# Patient Record
Sex: FEMALE | Race: BLACK OR AFRICAN AMERICAN | Employment: UNEMPLOYED | URBAN - METROPOLITAN AREA
[De-identification: names, ages, dates, MRNs, and addresses within clinical notes are randomized per-mention and may not be internally consistent; named-entity substitution may affect disease eponyms.]

---

## 2017-12-07 ENCOUNTER — HOSPITAL ENCOUNTER (EMERGENCY)
Age: 22
Discharge: HOME | End: 2017-12-07
Attending: EMERGENCY MEDICINE
Payer: SELF-PAY

## 2017-12-07 VITALS
RESPIRATION RATE: 17 BRPM | BODY MASS INDEX: 44.3 KG/M2 | OXYGEN SATURATION: 98 % | HEART RATE: 65 BPM | SYSTOLIC BLOOD PRESSURE: 131 MMHG | DIASTOLIC BLOOD PRESSURE: 62 MMHG | WEIGHT: 250 LBS | HEIGHT: 63 IN | TEMPERATURE: 98 F

## 2017-12-07 DIAGNOSIS — K52.9 GASTROENTERITIS: Primary | ICD-10-CM

## 2017-12-07 LAB
ALBUMIN SERPL-MCNC: 3.4 G/DL (ref 3.5–5)
ALBUMIN/GLOB SERPL: 0.8 {RATIO} (ref 1.2–3.5)
ALP SERPL-CCNC: 85 U/L (ref 50–136)
ALT SERPL-CCNC: 23 U/L (ref 12–65)
ANION GAP SERPL CALC-SCNC: 7 MMOL/L (ref 7–16)
AST SERPL-CCNC: 23 U/L (ref 15–37)
BASOPHILS # BLD: 0 K/UL (ref 0–0.2)
BASOPHILS NFR BLD: 0 % (ref 0–2)
BILIRUB SERPL-MCNC: 0.6 MG/DL (ref 0.2–1.1)
BUN SERPL-MCNC: 10 MG/DL (ref 6–23)
CALCIUM SERPL-MCNC: 8.7 MG/DL (ref 8.3–10.4)
CHLORIDE SERPL-SCNC: 106 MMOL/L (ref 98–107)
CO2 SERPL-SCNC: 27 MMOL/L (ref 21–32)
CREAT SERPL-MCNC: 0.89 MG/DL (ref 0.6–1)
DIFFERENTIAL METHOD BLD: ABNORMAL
EOSINOPHIL # BLD: 0 K/UL (ref 0–0.8)
EOSINOPHIL NFR BLD: 0 % (ref 0.5–7.8)
ERYTHROCYTE [DISTWIDTH] IN BLOOD BY AUTOMATED COUNT: 14.7 % (ref 11.9–14.6)
GLOBULIN SER CALC-MCNC: 4.3 G/DL (ref 2.3–3.5)
GLUCOSE SERPL-MCNC: 109 MG/DL (ref 65–100)
HCG UR QL: NEGATIVE
HCT VFR BLD AUTO: 43.5 % (ref 35.8–46.3)
HGB BLD-MCNC: 14 G/DL (ref 11.7–15.4)
IMM GRANULOCYTES # BLD: 0 K/UL (ref 0–0.5)
IMM GRANULOCYTES NFR BLD AUTO: 0 % (ref 0–5)
LIPASE SERPL-CCNC: 123 U/L (ref 73–393)
LYMPHOCYTES # BLD: 1 K/UL (ref 0.5–4.6)
LYMPHOCYTES NFR BLD: 13 % (ref 13–44)
MCH RBC QN AUTO: 25.9 PG (ref 26.1–32.9)
MCHC RBC AUTO-ENTMCNC: 32.2 G/DL (ref 31.4–35)
MCV RBC AUTO: 80.4 FL (ref 79.6–97.8)
MONOCYTES # BLD: 0.8 K/UL (ref 0.1–1.3)
MONOCYTES NFR BLD: 11 % (ref 4–12)
NEUTS SEG # BLD: 5.5 K/UL (ref 1.7–8.2)
NEUTS SEG NFR BLD: 76 % (ref 43–78)
PLATELET # BLD AUTO: 407 K/UL (ref 150–450)
PMV BLD AUTO: 9.5 FL (ref 10.8–14.1)
POTASSIUM SERPL-SCNC: 3.8 MMOL/L (ref 3.5–5.1)
PROT SERPL-MCNC: 7.7 G/DL (ref 6.3–8.2)
RBC # BLD AUTO: 5.41 M/UL (ref 4.05–5.25)
SODIUM SERPL-SCNC: 140 MMOL/L (ref 136–145)
WBC # BLD AUTO: 7.3 K/UL (ref 4.3–11.1)

## 2017-12-07 PROCEDURE — 81025 URINE PREGNANCY TEST: CPT

## 2017-12-07 PROCEDURE — 83690 ASSAY OF LIPASE: CPT | Performed by: EMERGENCY MEDICINE

## 2017-12-07 PROCEDURE — 80053 COMPREHEN METABOLIC PANEL: CPT | Performed by: EMERGENCY MEDICINE

## 2017-12-07 PROCEDURE — 81003 URINALYSIS AUTO W/O SCOPE: CPT | Performed by: EMERGENCY MEDICINE

## 2017-12-07 PROCEDURE — 74011250636 HC RX REV CODE- 250/636: Performed by: EMERGENCY MEDICINE

## 2017-12-07 PROCEDURE — 96361 HYDRATE IV INFUSION ADD-ON: CPT | Performed by: EMERGENCY MEDICINE

## 2017-12-07 PROCEDURE — 96374 THER/PROPH/DIAG INJ IV PUSH: CPT | Performed by: EMERGENCY MEDICINE

## 2017-12-07 PROCEDURE — 85025 COMPLETE CBC W/AUTO DIFF WBC: CPT | Performed by: EMERGENCY MEDICINE

## 2017-12-07 PROCEDURE — 99284 EMERGENCY DEPT VISIT MOD MDM: CPT | Performed by: EMERGENCY MEDICINE

## 2017-12-07 RX ORDER — ONDANSETRON 4 MG/1
4 TABLET, ORALLY DISINTEGRATING ORAL
Qty: 11 TAB | Refills: 1 | Status: SHIPPED | OUTPATIENT
Start: 2017-12-07 | End: 2018-11-25

## 2017-12-07 RX ORDER — ONDANSETRON 2 MG/ML
4 INJECTION INTRAMUSCULAR; INTRAVENOUS
Status: COMPLETED | OUTPATIENT
Start: 2017-12-07 | End: 2017-12-07

## 2017-12-07 RX ADMIN — ONDANSETRON 4 MG: 2 INJECTION INTRAMUSCULAR; INTRAVENOUS at 09:21

## 2017-12-07 RX ADMIN — SODIUM CHLORIDE 1000 ML: 900 INJECTION, SOLUTION INTRAVENOUS at 09:21

## 2017-12-07 NOTE — ED NOTES
I have reviewed discharge instructions with the patient. The patient verbalized understanding. Patient left ED via Discharge Method: ambulatory to Home with self. Opportunity for questions and clarification provided. Patient given 1 scripts. Esign not available. To continue your aftercare when you leave the hospital, you may receive an automated call from our care team to check in on how you are doing. This is a free service and part of our promise to provide the best care and service to meet your aftercare needs.  If you have questions, or wish to unsubscribe from this service please call [phone]. Thank you for Choosing our [facility] Emergency Department.

## 2017-12-07 NOTE — ED TRIAGE NOTES
Pt arrived ambulatory via POV with c/o vomiting since yesterday, can't keep anything down, Pt reports feeling hot and cold. Denies any sore throat, congestion or cough, states diarrhea that started today.

## 2017-12-07 NOTE — ED PROVIDER NOTES
HPI Comments: Patient is a 57-year-old female comes to the emergency department today complaining of chills, myalgias, and vomiting which began yesterday morning. She was unable to tolerate any oral intake yesterday. This morning she also had nonbloody diarrhea. She states that her  and her children had a recent stomach illness but are doing better. Patient is a 23 y.o. female presenting with vomiting. The history is provided by the patient and the spouse. Vomiting    This is a new problem. The current episode started yesterday. The problem occurs 5 to 10 times per day. The problem has not changed since onset. The emesis has an appearance of stomach contents. Patient reports a subjective fever - was not measured. The fever has been present for 1 - 2 days. Associated symptoms include chills, diarrhea and myalgias. Pertinent negatives include no abdominal pain, no headaches, no cough and no headaches. The patient is not pregnant. Risk factors include ill contacts. Past Medical History:   Diagnosis Date    Cellulitis     GERD (gastroesophageal reflux disease)     Migraine headache     Obesity     Otitis media     UTI (urinary tract infection)        Past Surgical History:   Procedure Laterality Date    HX GYN          HX HEENT      tonsils-adenoids         No family history on file. Social History     Social History    Marital status: SINGLE     Spouse name: N/A    Number of children: N/A    Years of education: N/A     Occupational History    Not on file. Social History Main Topics    Smoking status: Never Smoker    Smokeless tobacco: Never Used    Alcohol use No    Drug use: No    Sexual activity: Not on file     Other Topics Concern    Not on file     Social History Narrative         ALLERGIES: Review of patient's allergies indicates no known allergies. Review of Systems   Constitutional: Positive for chills. HENT: Negative. Eyes: Negative.     Respiratory: Negative for cough. Cardiovascular: Negative. Gastrointestinal: Positive for diarrhea, nausea and vomiting. Negative for abdominal pain. Endocrine: Negative. Genitourinary: Negative. Musculoskeletal: Positive for myalgias. Skin: Negative. Neurological: Negative for headaches. Vitals:    12/07/17 0901   BP: 118/82   Pulse: 90   Resp: 19   Temp: 98 °F (36.7 °C)   SpO2: 96%   Weight: 113.4 kg (250 lb)   Height: 5' 3\" (1.6 m)            Physical Exam   Constitutional: She is oriented to person, place, and time. She appears well-developed and well-nourished. HENT:   Head: Normocephalic and atraumatic. Eyes: EOM are normal. Pupils are equal, round, and reactive to light. Neck: Normal range of motion. Neck supple. Cardiovascular: Normal rate, regular rhythm and intact distal pulses. Pulmonary/Chest: Effort normal and breath sounds normal.   Abdominal: Soft. There is no tenderness. There is no rebound and no guarding. Neurological: She is alert and oriented to person, place, and time. Skin: Skin is warm and dry. MDM  Number of Diagnoses or Management Options  Diagnosis management comments: Differential diagnosis includes gastroenteritis, viral syndrome, food poisoning, pancreatitis, dehydration    We will hydrate with IV fluids and some Zofran. We'll check basic screening labs. Amount and/or Complexity of Data Reviewed  Clinical lab tests: ordered and reviewed    Risk of Complications, Morbidity, and/or Mortality  Presenting problems: moderate  Diagnostic procedures: low  Management options: low    Patient Progress  Patient progress: improved    ED Course   10:25 AM  Labs and urinalysis are negative. She feels better after fluids and Zofran. I feel this all likely represents a viral gastroenteritis. Voice dictation software was used during the making of this note. This software is not perfect and grammatical and other typographical errors may be present.   This note has been proofread, but may still contain errors.   Gisela Haynes MD; 12/7/2017 @10:25 AM   ===================================================================        Procedures

## 2017-12-07 NOTE — DISCHARGE INSTRUCTIONS
Gastroenteritis: Care Instructions  Your Care Instructions    Gastroenteritis is an illness that may cause nausea, vomiting, and diarrhea. It is sometimes called \"stomach flu. \" It can be caused by bacteria or a virus. You will probably begin to feel better in 1 to 2 days. In the meantime, get plenty of rest and make sure you do not become dehydrated. Dehydration occurs when your body loses too much fluid. Follow-up care is a key part of your treatment and safety. Be sure to make and go to all appointments, and call your doctor if you are having problems. It's also a good idea to know your test results and keep a list of the medicines you take. How can you care for yourself at home? · If your doctor prescribed antibiotics, take them as directed. Do not stop taking them just because you feel better. You need to take the full course of antibiotics. · Drink plenty of fluids to prevent dehydration, enough so that your urine is light yellow or clear like water. Choose water and other caffeine-free clear liquids until you feel better. If you have kidney, heart, or liver disease and have to limit fluids, talk with your doctor before you increase your fluid intake. · Drink fluids slowly, in frequent, small amounts, because drinking too much too fast can cause vomiting. · Begin eating mild foods, such as dry toast, yogurt, applesauce, bananas, and rice. Avoid spicy, hot, or high-fat foods, and do not drink alcohol or caffeine for a day or two. Do not drink milk or eat ice cream until you are feeling better. How to prevent gastroenteritis  · Keep hot foods hot and cold foods cold. · Do not eat meats, dressings, salads, or other foods that have been kept at room temperature for more than 2 hours. · Use a thermometer to check your refrigerator. It should be between 34°F and 40°F.  · Defrost meats in the refrigerator or microwave, not on the kitchen counter. · Keep your hands and your kitchen clean.  Wash your hands, cutting boards, and countertops with hot soapy water frequently. · Cook meat until it is well done. · Do not eat raw eggs or uncooked sauces made with raw eggs. · Do not take chances. If food looks or tastes spoiled, throw it out. When should you call for help? Call 911 anytime you think you may need emergency care. For example, call if:  ? · You vomit blood or what looks like coffee grounds. ? · You passed out (lost consciousness). ? · You pass maroon or very bloody stools. ?Call your doctor now or seek immediate medical care if:  ? · You have severe belly pain. ? · You have signs of needing more fluids. You have sunken eyes, a dry mouth, and pass only a little dark urine. ? · You feel like you are going to faint. ? · You have increased belly pain that does not go away in 1 to 2 days. ? · You have new or increased nausea, or you are vomiting. ? · You have a new or higher fever. ? · Your stools are black and tarlike or have streaks of blood. ? Watch closely for changes in your health, and be sure to contact your doctor if:  ? · You are dizzy or lightheaded. ? · You urinate less than usual, or your urine is dark yellow or brown. ? · You do not feel better with each day that goes by. Where can you learn more? Go to http://prerna-karly.info/. Enter N142 in the search box to learn more about \"Gastroenteritis: Care Instructions. \"  Current as of: March 3, 2017  Content Version: 11.4  © 9271-4532 [employer]. Care instructions adapted under license by [employer] (which disclaims liability or warranty for this information). If you have questions about a medical condition or this instruction, always ask your healthcare professional. [address] any warranty or liability for your use of this information.

## 2017-12-07 NOTE — LETTER
[address] [facility] EMERGENCY DEPT [address] [id number] [phone] Work/School Note Date: 12/7/2017 To Whom It May concern: 
 
Hilario Melendrez was seen and treated today in the emergency room by the following provider(s): 
Attending Provider: Buddy Jackson MD. Hilario Melendrez {Return to school/sport/work:12/8/2017 Sincerely, Buddy Jackson MD

## 2018-09-20 ENCOUNTER — HOSPITAL ENCOUNTER (EMERGENCY)
Age: 23
Discharge: HOME | End: 2018-09-21
Attending: EMERGENCY MEDICINE
Payer: COMMERCIAL

## 2018-09-20 DIAGNOSIS — G43.809 OTHER MIGRAINE WITHOUT STATUS MIGRAINOSUS, NOT INTRACTABLE: Primary | ICD-10-CM

## 2018-09-20 LAB
ALBUMIN SERPL-MCNC: 3.5 G/DL (ref 3.5–5)
ALBUMIN/GLOB SERPL: 0.8 {RATIO} (ref 1.2–3.5)
ALP SERPL-CCNC: 81 U/L (ref 50–136)
ALT SERPL-CCNC: 21 U/L (ref 12–65)
ANION GAP SERPL CALC-SCNC: 5 MMOL/L (ref 7–16)
AST SERPL-CCNC: 14 U/L (ref 15–37)
ATRIAL RATE: 70 BPM
BASOPHILS # BLD: 0.1 K/UL (ref 0–0.2)
BASOPHILS NFR BLD: 0 % (ref 0–2)
BILIRUB SERPL-MCNC: 0.4 MG/DL (ref 0.2–1.1)
BUN SERPL-MCNC: 7 MG/DL (ref 6–23)
CALCIUM SERPL-MCNC: 8.5 MG/DL (ref 8.3–10.4)
CALCULATED P AXIS, ECG09: 29 DEGREES
CALCULATED R AXIS, ECG10: 36 DEGREES
CALCULATED T AXIS, ECG11: 29 DEGREES
CHLORIDE SERPL-SCNC: 106 MMOL/L (ref 98–107)
CO2 SERPL-SCNC: 29 MMOL/L (ref 21–32)
CREAT SERPL-MCNC: 0.93 MG/DL (ref 0.6–1)
DIAGNOSIS, 93000: NORMAL
DIFFERENTIAL METHOD BLD: ABNORMAL
EOSINOPHIL # BLD: 0.1 K/UL (ref 0–0.8)
EOSINOPHIL NFR BLD: 1 % (ref 0.5–7.8)
ERYTHROCYTE [DISTWIDTH] IN BLOOD BY AUTOMATED COUNT: 13.5 %
GLOBULIN SER CALC-MCNC: 4.2 G/DL (ref 2.3–3.5)
GLUCOSE SERPL-MCNC: 92 MG/DL (ref 65–100)
HCT VFR BLD AUTO: 42 % (ref 35.8–46.3)
HGB BLD-MCNC: 13.3 G/DL (ref 11.7–15.4)
IMM GRANULOCYTES # BLD: 0 K/UL (ref 0–0.5)
IMM GRANULOCYTES NFR BLD AUTO: 0 % (ref 0–5)
LYMPHOCYTES # BLD: 2.6 K/UL (ref 0.5–4.6)
LYMPHOCYTES NFR BLD: 23 % (ref 13–44)
MCH RBC QN AUTO: 26.2 PG (ref 26.1–32.9)
MCHC RBC AUTO-ENTMCNC: 31.7 G/DL (ref 31.4–35)
MCV RBC AUTO: 82.7 FL (ref 79.6–97.8)
MONOCYTES # BLD: 0.7 K/UL (ref 0.1–1.3)
MONOCYTES NFR BLD: 6 % (ref 4–12)
NEUTS SEG # BLD: 7.9 K/UL (ref 1.7–8.2)
NEUTS SEG NFR BLD: 69 % (ref 43–78)
NRBC # BLD: 0 K/UL (ref 0–0.2)
P-R INTERVAL, ECG05: 126 MS
PLATELET # BLD AUTO: 428 K/UL (ref 150–450)
PMV BLD AUTO: 9.1 FL (ref 9.4–12.3)
POTASSIUM SERPL-SCNC: 3.8 MMOL/L (ref 3.5–5.1)
PROT SERPL-MCNC: 7.7 G/DL (ref 6.3–8.2)
Q-T INTERVAL, ECG07: 396 MS
QRS DURATION, ECG06: 74 MS
QTC CALCULATION (BEZET), ECG08: 427 MS
RBC # BLD AUTO: 5.08 M/UL (ref 4.05–5.2)
SODIUM SERPL-SCNC: 140 MMOL/L (ref 136–145)
VENTRICULAR RATE, ECG03: 70 BPM
WBC # BLD AUTO: 11.4 K/UL (ref 4.3–11.1)

## 2018-09-20 PROCEDURE — 93005 ELECTROCARDIOGRAM TRACING: CPT | Performed by: EMERGENCY MEDICINE

## 2018-09-20 PROCEDURE — 99284 EMERGENCY DEPT VISIT MOD MDM: CPT | Performed by: EMERGENCY MEDICINE

## 2018-09-20 PROCEDURE — 85025 COMPLETE CBC W/AUTO DIFF WBC: CPT

## 2018-09-20 PROCEDURE — 80053 COMPREHEN METABOLIC PANEL: CPT

## 2018-09-20 PROCEDURE — 81003 URINALYSIS AUTO W/O SCOPE: CPT | Performed by: EMERGENCY MEDICINE

## 2018-09-20 RX ORDER — KETOROLAC TROMETHAMINE 30 MG/ML
15 INJECTION, SOLUTION INTRAMUSCULAR; INTRAVENOUS
Status: COMPLETED | OUTPATIENT
Start: 2018-09-21 | End: 2018-09-21

## 2018-09-20 RX ORDER — ONDANSETRON 2 MG/ML
4 INJECTION INTRAMUSCULAR; INTRAVENOUS
Status: COMPLETED | OUTPATIENT
Start: 2018-09-21 | End: 2018-09-21

## 2018-09-21 VITALS
HEIGHT: 63 IN | WEIGHT: 250 LBS | DIASTOLIC BLOOD PRESSURE: 86 MMHG | SYSTOLIC BLOOD PRESSURE: 137 MMHG | HEART RATE: 68 BPM | TEMPERATURE: 98 F | BODY MASS INDEX: 44.3 KG/M2 | OXYGEN SATURATION: 99 % | RESPIRATION RATE: 18 BRPM

## 2018-09-21 LAB — HCG UR QL: NEGATIVE

## 2018-09-21 PROCEDURE — 96374 THER/PROPH/DIAG INJ IV PUSH: CPT | Performed by: EMERGENCY MEDICINE

## 2018-09-21 PROCEDURE — 96361 HYDRATE IV INFUSION ADD-ON: CPT | Performed by: EMERGENCY MEDICINE

## 2018-09-21 PROCEDURE — 81025 URINE PREGNANCY TEST: CPT

## 2018-09-21 PROCEDURE — 96375 TX/PRO/DX INJ NEW DRUG ADDON: CPT | Performed by: EMERGENCY MEDICINE

## 2018-09-21 PROCEDURE — 74011250636 HC RX REV CODE- 250/636: Performed by: EMERGENCY MEDICINE

## 2018-09-21 RX ADMIN — ONDANSETRON 4 MG: 2 INJECTION INTRAMUSCULAR; INTRAVENOUS at 00:37

## 2018-09-21 RX ADMIN — SODIUM CHLORIDE 1000 ML: 900 INJECTION, SOLUTION INTRAVENOUS at 00:37

## 2018-09-21 RX ADMIN — KETOROLAC TROMETHAMINE 15 MG: 30 INJECTION, SOLUTION INTRAMUSCULAR at 00:37

## 2018-09-21 NOTE — ED TRIAGE NOTES
Pt complains of nausea x 2 weeks. Today while at work she complains of episode of global weakness and \"seeing spots\". States she was able to drive home but continued to feel weak.

## 2018-09-21 NOTE — ED PROVIDER NOTES
HPI Comments: 43-year-old -American female history migraines developed flashing lights in her vision at work today and then developed a headache. Nausea without vomiting, fever, diarrhea. This evening she felt hot. She did try Motrin with no improvement. No neck pain. No dysuria or hematuria. Patient is a 20 y.o. female presenting with fatigue. The history is provided by the patient. Fatigue Pertinent negatives include no shortness of breath, no chest pain, no vomiting and no headaches. Past Medical History:  
Diagnosis Date  Cellulitis  GERD (gastroesophageal reflux disease)  Migraine headache  Obesity  Otitis media  UTI (urinary tract infection) Past Surgical History:  
Procedure Laterality Date  HX GYN    
   HX HEENT    
 tonsils-adenoids No family history on file. Social History Social History  Marital status: SINGLE Spouse name: N/A  
 Number of children: N/A  
 Years of education: N/A Occupational History  Not on file. Social History Main Topics  Smoking status: Never Smoker  Smokeless tobacco: Never Used  Alcohol use No  
 Drug use: No  
 Sexual activity: Not on file Other Topics Concern  Not on file Social History Narrative ALLERGIES: Review of patient's allergies indicates no known allergies. Review of Systems Constitutional: Positive for fatigue. Negative for fever. HENT: Negative for congestion. Respiratory: Negative for cough and shortness of breath. Cardiovascular: Negative for chest pain. Gastrointestinal: Negative for abdominal pain and vomiting. Genitourinary: Negative for dysuria. Musculoskeletal: Negative for back pain and neck pain. Skin: Negative for rash. Neurological: Negative for headaches. Vitals:  
 18 2233 18 0036 BP: (!) 152/94 137/86 Pulse: 70 68 Resp: 18 18 Temp: 98 °F (36.7 °C) SpO2: 96% 99% Weight: 113.4 kg (250 lb) Height: 5' 3\" (1.6 m) Physical Exam  
Constitutional: She is oriented to person, place, and time. She appears well-developed and well-nourished. No distress. HENT:  
Head: Normocephalic and atraumatic. Mouth/Throat: Oropharynx is clear and moist.  
Eyes: Conjunctivae and EOM are normal. Pupils are equal, round, and reactive to light. Neck: Normal range of motion. Neck supple. Cardiovascular: Normal rate and regular rhythm. No murmur heard. Pulmonary/Chest: Effort normal and breath sounds normal. She has no wheezes. Abdominal: Soft. She exhibits no distension. There is no tenderness. Musculoskeletal: Normal range of motion. She exhibits no edema or tenderness. Neurological: She is alert and oriented to person, place, and time. No cranial nerve deficit. She exhibits normal muscle tone. Skin: Skin is warm and dry. Psychiatric: She has a normal mood and affect. Nursing note and vitals reviewed. MDM Number of Diagnoses or Management Options Other migraine without status migrainosus, not intractable:  
Diagnosis management comments: Lab work is unremarkable. Pregnancy negative treated with IV fluids, Toradol, Zofran. Symptoms have improved. At this time she has unremarkable vital signs, reassuring workup and normal neuro exam.  Suspect this is migraine. She appears safe for discharge home. Amount and/or Complexity of Data Reviewed Clinical lab tests: ordered and reviewed Tests in the medicine section of CPT®: ordered and reviewed Risk of Complications, Morbidity, and/or Mortality Presenting problems: low Diagnostic procedures: low Management options: low ED Course Procedures

## 2018-09-21 NOTE — ED NOTES
I have reviewed discharge instructions with the patient. The patient verbalized understanding. Patient left ED via Discharge Method: ambulatory to Home with . Opportunity for questions and clarification provided. Patient given 0 scripts. To continue your aftercare when you leave the hospital, you may receive an automated call from our care team to check in on how you are doing. This is a free service and part of our promise to provide the best care and service to meet your aftercare needs.  If you have questions, or wish to unsubscribe from this service please call [phone]. Thank you for Choosing our [facility] Emergency Department.

## 2018-09-21 NOTE — DISCHARGE INSTRUCTIONS
Migraine Headache: Care Instructions  Your Care Instructions  Migraines are painful, throbbing headaches that often start on one side of the head. They may cause nausea and vomiting and make you sensitive to light, sound, or smell. Without treatment, migraines can last from 4 hours to a few days. Medicines can help prevent migraines or stop them after they have started. Your doctor can help you find which ones work best for you. Follow-up care is a key part of your treatment and safety. Be sure to make and go to all appointments, and call your doctor if you are having problems. It's also a good idea to know your test results and keep a list of the medicines you take. How can you care for yourself at home? · Do not drive if you have taken a prescription pain medicine. · Rest in a quiet, dark room until your headache is gone. Close your eyes, and try to relax or go to sleep. Don't watch TV or read. · Put a cold, moist cloth or cold pack on the painful area for 10 to 20 minutes at a time. Put a thin cloth between the cold pack and your skin. · Use a warm, moist towel or a heating pad set on low to relax tight shoulder and neck muscles. · Have someone gently massage your neck and shoulders. · Take your medicines exactly as prescribed. Call your doctor if you think you are having a problem with your medicine. You will get more details on the specific medicines your doctor prescribes. · Be careful not to take pain medicine more often than the instructions allow. You could get worse or more frequent headaches when the medicine wears off. To prevent migraines  · Keep a headache diary so you can figure out what triggers your headaches. Avoiding triggers may help you prevent headaches. Record when each headache began, how long it lasted, and what the pain was like.  (Was it throbbing, aching, stabbing, or dull?) Write down any other symptoms you had with the headache, such as nausea, flashing lights or dark spots, or sensitivity to bright light or loud noise. Note if the headache occurred near your period. List anything that might have triggered the headache. Triggers may include certain foods (chocolate, cheese, wine) or odors, smoke, bright light, stress, or lack of sleep. · If your doctor has prescribed medicine for your migraines, take it as directed. You may have medicine that you take only when you get a migraine and medicine that you take all the time to help prevent migraines. ¨ If your doctor has prescribed medicine for when you get a headache, take it at the first sign of a migraine, unless your doctor has given you other instructions. ¨ If your doctor has prescribed medicine to prevent migraines, take it exactly as prescribed. Call your doctor if you think you are having a problem with your medicine. · Find healthy ways to deal with stress. Migraines are most common during or right after stressful times. Take time to relax before and after you do something that has caused a migraine in the past.  · Try to keep your muscles relaxed by keeping good posture. Check your jaw, face, neck, and shoulder muscles for tension. Try to relax them. When you sit at a desk, change positions often. And make sure to stretch for 30 seconds each hour. · Get plenty of sleep and exercise. · Eat meals on a regular schedule. Avoid foods and drinks that often trigger migraines. These include chocolate, alcohol (especially red wine and port), aspartame, monosodium glutamate (MSG), and some additives found in foods (such as hot dogs, carlos, cold cuts, aged cheeses, and pickled foods). · Limit caffeine. Don't drink too much coffee, tea, or soda. But don't quit caffeine suddenly. That can also give you migraines. · Do not smoke or allow others to smoke around you. If you need help quitting, talk to your doctor about stop-smoking programs and medicines. These can increase your chances of quitting for good.   · If you are taking birth control pills or hormone therapy, talk to your doctor about whether they are triggering your migraines. When should you call for help? Call 911 anytime you think you may need emergency care. For example, call if:    · You have signs of a stroke. These may include:  ¨ Sudden numbness, paralysis, or weakness in your face, arm, or leg, especially on only one side of your body. ¨ Sudden vision changes. ¨ Sudden trouble speaking. ¨ Sudden confusion or trouble understanding simple statements. ¨ Sudden problems with walking or balance. ¨ A sudden, severe headache that is different from past headaches.    Call your doctor now or seek immediate medical care if:    · You have new or worse nausea and vomiting.     · You have a new or higher fever.     · Your headache gets much worse.    Watch closely for changes in your health, and be sure to contact your doctor if:    · You are not getting better after 2 days (48 hours). Where can you learn more? Go to http://prerna-karly.info/. Enter N770 in the search box to learn more about \"Migraine Headache: Care Instructions. \"  Current as of: October 9, 2017  Content Version: 11.7  © 0487-0942 [employer]. Care instructions adapted under license by [employer] (which disclaims liability or warranty for this information). If you have questions about a medical condition or this instruction, always ask your healthcare professional. [address] any warranty or liability for your use of this information.

## 2018-11-25 ENCOUNTER — HOSPITAL ENCOUNTER (EMERGENCY)
Age: 23
Discharge: HOME | End: 2018-11-25
Attending: EMERGENCY MEDICINE
Payer: SELF-PAY

## 2018-11-25 ENCOUNTER — APPOINTMENT (OUTPATIENT)
Dept: GENERAL RADIOLOGY | Age: 23
End: 2018-11-25
Attending: EMERGENCY MEDICINE
Payer: SELF-PAY

## 2018-11-25 VITALS
WEIGHT: 250 LBS | SYSTOLIC BLOOD PRESSURE: 118 MMHG | BODY MASS INDEX: 44.3 KG/M2 | RESPIRATION RATE: 16 BRPM | TEMPERATURE: 98.1 F | HEIGHT: 63 IN | HEART RATE: 98 BPM | DIASTOLIC BLOOD PRESSURE: 68 MMHG | OXYGEN SATURATION: 99 %
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
## 2018-11-25 DIAGNOSIS — S39.012A LUMBAR STRAIN, INITIAL ENCOUNTER: ICD-10-CM

## 2018-11-25 DIAGNOSIS — S80.02XA CONTUSION OF LEFT KNEE, INITIAL ENCOUNTER: ICD-10-CM

## 2018-11-25 PROCEDURE — 74011250637 HC RX REV CODE- 250/637: Performed by: PHYSICIAN ASSISTANT

## 2018-11-25 PROCEDURE — 72100 X-RAY EXAM L-S SPINE 2/3 VWS: CPT

## 2018-11-25 PROCEDURE — 73562 X-RAY EXAM OF KNEE 3: CPT

## 2018-11-25 PROCEDURE — 99283 EMERGENCY DEPT VISIT LOW MDM: CPT | Performed by: PHYSICIAN ASSISTANT

## 2018-11-25 RX ORDER — BUTALBITAL, ACETAMINOPHEN AND CAFFEINE 50; 325; 40 MG/1; MG/1; MG/1
2 TABLET ORAL
Status: COMPLETED | OUTPATIENT
Start: 2018-11-25 | End: 2018-11-25

## 2018-11-25 RX ORDER — TRAMADOL HYDROCHLORIDE 50 MG/1
50 TABLET ORAL
Qty: 20 TAB | Refills: 0 | Status: ON HOLD | OUTPATIENT
Start: 2018-11-25 | End: 2020-09-24

## 2018-11-25 RX ORDER — METHOCARBAMOL 750 MG/1
750 TABLET, FILM COATED ORAL
Qty: 20 TAB | Refills: 0 | Status: ON HOLD | OUTPATIENT
Start: 2018-11-25 | End: 2020-09-24

## 2018-11-25 RX ORDER — KETOROLAC TROMETHAMINE 10 MG/1
10 TABLET, FILM COATED ORAL
Status: COMPLETED | OUTPATIENT
Start: 2018-11-25 | End: 2018-11-25

## 2018-11-25 RX ADMIN — BUTALBITAL, ACETAMINOPHEN AND CAFFEINE 2 TABLET: 50; 325; 40 TABLET ORAL at 19:03

## 2018-11-25 RX ADMIN — KETOROLAC TROMETHAMINE 10 MG: 10 TABLET, FILM COATED ORAL at 19:03
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
## 2018-11-25 NOTE — DISCHARGE INSTRUCTIONS
Back Strain: Care Instructions  Overview    A back strain happens when you overstretch, or pull, a muscle in your back. You may hurt your back in an accident or when you exercise or lift something. Sometimes you may not know how you hurt your back. Most back pain will get better with rest and time. You can take care of yourself at home to help your back heal.  Follow-up care is a key part of your treatment and safety. Be sure to make and go to all appointments, and call your doctor if you are having problems. It's also a good idea to know your test results and keep a list of the medicines you take. How can you care for yourself at home? · Try to stay as active as you can, but stop or reduce any activity that causes pain. · Put ice or a cold pack on the sore muscle for 10 to 20 minutes at a time to stop swelling. Try this every 1 to 2 hours for 3 days (when you are awake) or until the swelling goes down. Put a thin cloth between the ice pack and your skin. · After 2 or 3 days, apply a heating pad on low or a warm cloth to your back. Some doctors suggest that you go back and forth between hot and cold treatments. · Take pain medicines exactly as directed. ? If the doctor gave you a prescription medicine for pain, take it as prescribed. ? If you are not taking a prescription pain medicine, ask your doctor if you can take an over-the-counter medicine. · Try sleeping on your side with a pillow between your legs. Or put a pillow under your knees when you lie on your back. These measures can ease pain in your lower back. · Return to your usual level of activity slowly. When should you call for help? Call 911 anytime you think you may need emergency care. For example, call if:    · You are unable to move a leg at all.   [facility] your doctor now or seek immediate medical care if:    · You have new or worse symptoms in your legs, belly, or buttocks.  Symptoms may include:  ? Numbness or tingling. ? Weakness. ? Pain.     · You lose bladder or bowel control.    Watch closely for changes in your health, and be sure to contact your doctor if:    · You have a fever, lose weight, or don't feel well.     · You are not getting better as expected. Where can you learn more? Go to http://prerna-karly.info/. Enter H153 in the search box to learn more about \"Back Strain: Care Instructions. \"  Current as of: November 29, 2017  Content Version: 11.8  © 8922-3826 [employer]. Care instructions adapted under license by [employer] (which disclaims liability or warranty for this information). If you have questions about a medical condition or this instruction, always ask your healthcare professional. [address] any warranty or liability for your use of this information. Back Strain: Care Instructions  Overview    A back strain happens when you overstretch, or pull, a muscle in your back. You may hurt your back in an accident or when you exercise or lift something. Sometimes you may not know how you hurt your back. Most back pain will get better with rest and time. You can take care of yourself at home to help your back heal.  Follow-up care is a key part of your treatment and safety. Be sure to make and go to all appointments, and call your doctor if you are having problems. It's also a good idea to know your test results and keep a list of the medicines you take. How can you care for yourself at home? · Try to stay as active as you can, but stop or reduce any activity that causes pain. · Put ice or a cold pack on the sore muscle for 10 to 20 minutes at a time to stop swelling. Try this every 1 to 2 hours for 3 days (when you are awake) or until the swelling goes down. Put a thin cloth between the ice pack and your skin. · After 2 or 3 days, apply a heating pad on low or a warm cloth to your back.  Some doctors suggest that you go back and forth between hot and cold treatments. · Take pain medicines exactly as directed. ? If the doctor gave you a prescription medicine for pain, take it as prescribed. ? If you are not taking a prescription pain medicine, ask your doctor if you can take an over-the-counter medicine. · Try sleeping on your side with a pillow between your legs. Or put a pillow under your knees when you lie on your back. These measures can ease pain in your lower back. · Return to your usual level of activity slowly. When should you call for help? Call 911 anytime you think you may need emergency care. For example, call if:    · You are unable to move a leg at all.   [facility] your doctor now or seek immediate medical care if:    · You have new or worse symptoms in your legs, belly, or buttocks. Symptoms may include:  ? Numbness or tingling. ? Weakness. ? Pain.     · You lose bladder or bowel control.    Watch closely for changes in your health, and be sure to contact your doctor if:    · You have a fever, lose weight, or don't feel well.     · You are not getting better as expected. Where can you learn more? Go to http://prerna-karly.info/. Enter O589 in the search box to learn more about \"Back Strain: Care Instructions. \"  Current as of: November 29, 2017  Content Version: 11.8  © 3014-8659 [employer]. Care instructions adapted under license by [employer] (which disclaims liability or warranty for this information). If you have questions about a medical condition or this instruction, always ask your healthcare professional. [place] [address] any warranty or liability for your use of this information. Back Strain: Care Instructions  Overview    A back strain happens when you overstretch, or pull, a muscle in your back. You may hurt your back in an accident or when you exercise or lift something. Sometimes you may not know how you hurt your back.   Most back pain will get better with rest and time. You can take care of yourself at home to help your back heal.  Follow-up care is a key part of your treatment and safety. Be sure to make and go to all appointments, and call your doctor if you are having problems. It's also a good idea to know your test results and keep a list of the medicines you take. How can you care for yourself at home? · Try to stay as active as you can, but stop or reduce any activity that causes pain. · Put ice or a cold pack on the sore muscle for 10 to 20 minutes at a time to stop swelling. Try this every 1 to 2 hours for 3 days (when you are awake) or until the swelling goes down. Put a thin cloth between the ice pack and your skin. · After 2 or 3 days, apply a heating pad on low or a warm cloth to your back. Some doctors suggest that you go back and forth between hot and cold treatments. · Take pain medicines exactly as directed. ? If the doctor gave you a prescription medicine for pain, take it as prescribed. ? If you are not taking a prescription pain medicine, ask your doctor if you can take an over-the-counter medicine. · Try sleeping on your side with a pillow between your legs. Or put a pillow under your knees when you lie on your back. These measures can ease pain in your lower back. · Return to your usual level of activity slowly. When should you call for help? Call 911 anytime you think you may need emergency care. For example, call if:    · You are unable to move a leg at all.   [facility] your doctor now or seek immediate medical care if:    · You have new or worse symptoms in your legs, belly, or buttocks. Symptoms may include:  ? Numbness or tingling. ? Weakness. ? Pain.     · You lose bladder or bowel control.    Watch closely for changes in your health, and be sure to contact your doctor if:    · You have a fever, lose weight, or don't feel well.     · You are not getting better as expected.    Where can you learn more?  Go to http://prerna-karly.info/. Enter W090 in the search box to learn more about \"Back Strain: Care Instructions. \"  Current as of: November 29, 2017  Content Version: 11.8  © 1736-4747 [employer]. Care instructions adapted under license by [employer] (which disclaims liability or warranty for this information). If you have questions about a medical condition or this instruction, always ask your healthcare professional. [address] any warranty or liability for your use of this information.

## 2018-11-25 NOTE — LETTER
[facility] EMERGENCY DEPT One [address] [id number] 
[phone] Work/School Note Date: 11/25/2018 To Whom It May concern: 
 
Lis Rosas was seen and treated today in the emergency room by the following provider(s): 
Attending Provider: Ary Ovalle MD 
Physician Assistant: SARAH Celeste. Lis Rosas may return to work on 11/28/18. Sincerely, SARAH Bronson

## 2018-11-26 NOTE — ED NOTES
I have reviewed discharge instructions with the patient. The patient verbalized understanding. Patient left ED via Discharge Method: ambulatory to Home with (family). Opportunity for questions and clarification provided. Patient given 2 scripts. To continue your aftercare when you leave the hospital, you may receive an automated call from our care team to check in on how you are doing. This is a free service and part of our promise to provide the best care and service to meet your aftercare needs.  If you have questions, or wish to unsubscribe from this service please call [phone]. Thank you for Choosing our [facility] Emergency Department.

## 2019-03-04 ENCOUNTER — HOSPITAL ENCOUNTER (EMERGENCY)
Age: 24
Discharge: HOME | End: 2019-03-04
Attending: EMERGENCY MEDICINE
Payer: SELF-PAY

## 2019-03-04 VITALS
DIASTOLIC BLOOD PRESSURE: 79 MMHG | BODY MASS INDEX: 44.3 KG/M2 | OXYGEN SATURATION: 99 % | HEART RATE: 86 BPM | TEMPERATURE: 98.5 F | RESPIRATION RATE: 20 BRPM | SYSTOLIC BLOOD PRESSURE: 129 MMHG | HEIGHT: 63 IN | WEIGHT: 250 LBS

## 2019-03-04 DIAGNOSIS — K52.9 GASTROENTERITIS: Primary | ICD-10-CM

## 2019-03-04 LAB
ALBUMIN SERPL-MCNC: 3.4 G/DL (ref 3.5–5)
ALBUMIN/GLOB SERPL: 0.8 {RATIO} (ref 1.2–3.5)
ALP SERPL-CCNC: 76 U/L (ref 50–136)
ALT SERPL-CCNC: 14 U/L (ref 12–65)
AMORPH CRY URNS QL MICRO: ABNORMAL
ANION GAP SERPL CALC-SCNC: 7 MMOL/L (ref 7–16)
AST SERPL-CCNC: 8 U/L (ref 15–37)
BACTERIA URNS QL MICRO: ABNORMAL /HPF
BASOPHILS # BLD: 0 K/UL (ref 0–0.2)
BASOPHILS NFR BLD: 0 % (ref 0–2)
BILIRUB SERPL-MCNC: 0.6 MG/DL (ref 0.2–1.1)
BUN SERPL-MCNC: 7 MG/DL (ref 6–23)
CALCIUM SERPL-MCNC: 8.9 MG/DL (ref 8.3–10.4)
CASTS URNS QL MICRO: 0 /LPF
CHLORIDE SERPL-SCNC: 106 MMOL/L (ref 98–107)
CO2 SERPL-SCNC: 27 MMOL/L (ref 21–32)
CREAT SERPL-MCNC: 0.87 MG/DL (ref 0.6–1)
CRYSTALS URNS QL MICRO: 0 /LPF
DIFFERENTIAL METHOD BLD: ABNORMAL
EOSINOPHIL # BLD: 0 K/UL (ref 0–0.8)
EOSINOPHIL NFR BLD: 0 % (ref 0.5–7.8)
EPI CELLS #/AREA URNS HPF: ABNORMAL /HPF
ERYTHROCYTE [DISTWIDTH] IN BLOOD BY AUTOMATED COUNT: 14.1 % (ref 11.9–14.6)
FLUAV AG NPH QL IA: NEGATIVE
FLUBV AG NPH QL IA: NEGATIVE
GLOBULIN SER CALC-MCNC: 4.3 G/DL (ref 2.3–3.5)
GLUCOSE SERPL-MCNC: 98 MG/DL (ref 65–100)
HCG UR QL: NEGATIVE
HCT VFR BLD AUTO: 46.7 % (ref 35.8–46.3)
HGB BLD-MCNC: 14.8 G/DL (ref 11.7–15.4)
IMM GRANULOCYTES # BLD AUTO: 0 K/UL (ref 0–0.5)
IMM GRANULOCYTES NFR BLD AUTO: 0 % (ref 0–5)
LIPASE SERPL-CCNC: 105 U/L (ref 73–393)
LYMPHOCYTES # BLD: 0.9 K/UL (ref 0.5–4.6)
LYMPHOCYTES NFR BLD: 9 % (ref 13–44)
MCH RBC QN AUTO: 26.6 PG (ref 26.1–32.9)
MCHC RBC AUTO-ENTMCNC: 31.7 G/DL (ref 31.4–35)
MCV RBC AUTO: 84 FL (ref 79.6–97.8)
MONOCYTES # BLD: 0.6 K/UL (ref 0.1–1.3)
MONOCYTES NFR BLD: 7 % (ref 4–12)
MUCOUS THREADS URNS QL MICRO: ABNORMAL /LPF
NEUTS SEG # BLD: 7.9 K/UL (ref 1.7–8.2)
NEUTS SEG NFR BLD: 84 % (ref 43–78)
NRBC # BLD: 0 K/UL (ref 0–0.2)
PLATELET # BLD AUTO: 376 K/UL (ref 150–450)
PMV BLD AUTO: 9.7 FL (ref 9.4–12.3)
POTASSIUM SERPL-SCNC: 3.7 MMOL/L (ref 3.5–5.1)
PROT SERPL-MCNC: 7.7 G/DL (ref 6.3–8.2)
RBC # BLD AUTO: 5.56 M/UL (ref 4.05–5.2)
RBC #/AREA URNS HPF: ABNORMAL /HPF
SODIUM SERPL-SCNC: 140 MMOL/L (ref 136–145)
SPECIMEN SOURCE: NORMAL
WBC # BLD AUTO: 9.4 K/UL (ref 4.3–11.1)
WBC URNS QL MICRO: ABNORMAL /HPF

## 2019-03-04 PROCEDURE — 96374 THER/PROPH/DIAG INJ IV PUSH: CPT | Performed by: EMERGENCY MEDICINE

## 2019-03-04 PROCEDURE — 74011250636 HC RX REV CODE- 250/636: Performed by: EMERGENCY MEDICINE

## 2019-03-04 PROCEDURE — 81015 MICROSCOPIC EXAM OF URINE: CPT

## 2019-03-04 PROCEDURE — 80053 COMPREHEN METABOLIC PANEL: CPT

## 2019-03-04 PROCEDURE — 87804 INFLUENZA ASSAY W/OPTIC: CPT

## 2019-03-04 PROCEDURE — 85025 COMPLETE CBC W/AUTO DIFF WBC: CPT

## 2019-03-04 PROCEDURE — 81025 URINE PREGNANCY TEST: CPT

## 2019-03-04 PROCEDURE — 99284 EMERGENCY DEPT VISIT MOD MDM: CPT | Performed by: EMERGENCY MEDICINE

## 2019-03-04 PROCEDURE — 83690 ASSAY OF LIPASE: CPT

## 2019-03-04 PROCEDURE — 81003 URINALYSIS AUTO W/O SCOPE: CPT | Performed by: EMERGENCY MEDICINE

## 2019-03-04 RX ORDER — ONDANSETRON 4 MG/1
4 TABLET, ORALLY DISINTEGRATING ORAL
Qty: 11 TAB | Refills: 1 | Status: ON HOLD | OUTPATIENT
Start: 2019-03-04 | End: 2020-09-24

## 2019-03-04 RX ORDER — ONDANSETRON 2 MG/ML
4 INJECTION INTRAMUSCULAR; INTRAVENOUS
Status: COMPLETED | OUTPATIENT
Start: 2019-03-04 | End: 2019-03-04

## 2019-03-04 RX ADMIN — SODIUM CHLORIDE 1000 ML: 900 INJECTION, SOLUTION INTRAVENOUS at 11:19

## 2019-03-04 RX ADMIN — ONDANSETRON 4 MG: 2 INJECTION INTRAMUSCULAR; INTRAVENOUS at 11:19

## 2019-03-04 NOTE — DISCHARGE INSTRUCTIONS
Used the nausea medication as prescribed. Gradually advance your diet as tolerated. Return to the ER for any new or worsening symptoms.

## 2019-03-04 NOTE — ED NOTES
I have reviewed discharge instructions with the patient. The patient verbalized understanding. Patient left ED via Discharge Method: ambulatory to Home with (insert name of family/friend, self). Opportunity for questions and clarification provided. Patient given 1 scripts. To continue your aftercare when you leave the hospital, you may receive an automated call from our care team to check in on how you are doing. This is a free service and part of our promise to provide the best care and service to meet your aftercare needs.  If you have questions, or wish to unsubscribe from this service please call [phone]. Thank you for Choosing our [facility] Emergency Department.

## 2019-03-04 NOTE — ED PROVIDER NOTES
Patient is a 25-year-old female who states she started with fever, chills, malaise, myalgias, nausea, vomiting, diarrhea yesterday. She denies any bleeding. No known sick contacts. The history is provided by the patient. Generalized Body Aches The current episode started yesterday. The problem occurs constantly. The problem has been gradually worsening. Associated symptoms include abdominal pain. Pertinent negatives include no chest pain, no headaches and no shortness of breath. Nothing relieves the symptoms. She has tried nothing for the symptoms. The treatment provided no relief. Past Medical History:  
Diagnosis Date  Cellulitis  GERD (gastroesophageal reflux disease)  Migraine headache  Obesity  Otitis media  UTI (urinary tract infection) Past Surgical History:  
Procedure Laterality Date  HX GYN    
   HX HEENT    
 tonsils-adenoids No family history on file. Social History Socioeconomic History  Marital status: SINGLE Spouse name: Not on file  Number of children: Not on file  Years of education: Not on file  Highest education level: Not on file Social Needs  Financial resource strain: Not on file  Food insecurity - worry: Not on file  Food insecurity - inability: Not on file  Transportation needs - medical: Not on file  Transportation needs - non-medical: Not on file Occupational History  Not on file Tobacco Use  Smoking status: Never Smoker  Smokeless tobacco: Never Used Substance and Sexual Activity  Alcohol use: No  
 Drug use: No  
 Sexual activity: Not on file Other Topics Concern  Not on file Social History Narrative  Not on file ALLERGIES: Patient has no known allergies. Review of Systems Constitutional: Positive for chills and fatigue. Negative for fever. HENT: Negative for congestion, rhinorrhea and sore throat. Eyes: Negative for pain, discharge and visual disturbance. Respiratory: Negative for cough and shortness of breath. Cardiovascular: Negative for chest pain and palpitations. Gastrointestinal: Positive for abdominal pain, diarrhea, nausea and vomiting. Endocrine: Negative for polydipsia and polyuria. Genitourinary: Negative for dysuria, frequency and urgency. Musculoskeletal: Negative for back pain and neck pain. Skin: Negative for rash. Neurological: Negative for seizures, syncope, weakness and headaches. Hematological: Negative. Vitals:  
 03/04/19 1026 BP: 134/87 Pulse: 99 Resp: 16 Temp: 98.5 °F (36.9 °C) SpO2: 99% Weight: 113.4 kg (250 lb) Height: 5' 3\" (1.6 m) Physical Exam  
Constitutional: She is oriented to person, place, and time. She appears well-developed and well-nourished. HENT:  
Head: Normocephalic and atraumatic. Mouth/Throat: Oropharynx is clear and moist. No oropharyngeal exudate. Eyes: Conjunctivae and EOM are normal. Pupils are equal, round, and reactive to light. Neck: Normal range of motion. Neck supple. Cardiovascular: Regular rhythm and intact distal pulses. Tachycardia present. Pulmonary/Chest: Effort normal and breath sounds normal.  
Abdominal: Soft. There is no tenderness. There is no rebound and no guarding. Musculoskeletal: Normal range of motion. She exhibits no edema or deformity. Lymphadenopathy:  
  She has no cervical adenopathy. Neurological: She is alert and oriented to person, place, and time. She has normal strength. No cranial nerve deficit or sensory deficit. GCS eye subscore is 4. GCS verbal subscore is 5. GCS motor subscore is 6. Skin: Skin is warm and dry. No rash noted. Nursing note and vitals reviewed. MDM Number of Diagnoses or Management Options Diagnosis management comments: Flu swab is negative 12:13 PM 
Blood work unremarkable. Not pregnant.   I think this is all viral gastroenteritis. Feeling better after IV fluids and meds. Voice dictation software was used during the making of this note. This software is not perfect and grammatical and other typographical errors may be present. This note has been proofread, but may still contain errors. Maisha Pinto MD; 3/4/2019 @12:13 PM  
=================================================================== Amount and/or Complexity of Data Reviewed Clinical lab tests: ordered and reviewed Independent visualization of images, tracings, or specimens: yes Risk of Complications, Morbidity, and/or Mortality Presenting problems: low Diagnostic procedures: low Management options: low Patient Progress Patient progress: improved Procedures

## 2019-03-04 NOTE — LETTER
[facility] EMERGENCY DEPT One [address] [id number] [phone] Work/School Note Date: 3/4/2019 To Whom It May concern: 
 
Karen Doherty was seen and treated today in the emergency room by the following provider(s): 
Attending Provider: Dakota Friend MD 
Resident: Merline Bo. Karen Doherty {Return to school/sport/work: 3/6/2019 Sincerely, Jodi Cavazos MD

## 2020-09-20 ENCOUNTER — APPOINTMENT (OUTPATIENT)
Dept: GENERAL RADIOLOGY | Age: 25
DRG: 282 | End: 2020-09-20
Attending: EMERGENCY MEDICINE
Payer: MEDICAID

## 2020-09-20 ENCOUNTER — ANESTHESIA EVENT (OUTPATIENT)
Dept: ENDOSCOPY | Age: 25
DRG: 282 | End: 2020-09-20
Payer: MEDICAID

## 2020-09-20 ENCOUNTER — HOSPITAL ENCOUNTER (INPATIENT)
Age: 25
LOS: 4 days | Discharge: HOME | DRG: 282 | End: 2020-09-24
Attending: EMERGENCY MEDICINE | Admitting: FAMILY MEDICINE
Payer: MEDICAID

## 2020-09-20 ENCOUNTER — APPOINTMENT (OUTPATIENT)
Dept: ULTRASOUND IMAGING | Age: 25
DRG: 282 | End: 2020-09-20
Attending: EMERGENCY MEDICINE
Payer: MEDICAID

## 2020-09-20 ENCOUNTER — APPOINTMENT (OUTPATIENT)
Dept: GENERAL RADIOLOGY | Age: 25
DRG: 282 | End: 2020-09-20
Attending: HOSPITALIST
Payer: MEDICAID

## 2020-09-20 ENCOUNTER — ANESTHESIA (OUTPATIENT)
Dept: ENDOSCOPY | Age: 25
DRG: 282 | End: 2020-09-20
Payer: MEDICAID

## 2020-09-20 DIAGNOSIS — K85.90 ACUTE PANCREATITIS, UNSPECIFIED COMPLICATION STATUS, UNSPECIFIED PANCREATITIS TYPE: Primary | ICD-10-CM

## 2020-09-20 DIAGNOSIS — K80.50 CHOLEDOCHOLITHIASIS: ICD-10-CM

## 2020-09-20 DIAGNOSIS — K85.10 ACUTE PANCREATITIS DUE TO CALCULUS OF COMMON BILE DUCT: ICD-10-CM

## 2020-09-20 PROBLEM — R11.2 NON-INTRACTABLE VOMITING WITH NAUSEA: Status: ACTIVE | Noted: 2020-09-20

## 2020-09-20 PROBLEM — K83.09 CHOLANGITIS: Status: RESOLVED | Noted: 2020-09-20 | Resolved: 2020-09-20

## 2020-09-20 PROBLEM — I10 CHRONIC HYPERTENSION: Status: ACTIVE | Noted: 2019-12-19

## 2020-09-20 PROBLEM — K83.09 CHOLANGITIS: Status: ACTIVE | Noted: 2020-09-20

## 2020-09-20 PROBLEM — R74.01 TRANSAMINITIS: Status: ACTIVE | Noted: 2020-09-20

## 2020-09-20 PROBLEM — E80.6 HYPERBILIRUBINEMIA: Status: ACTIVE | Noted: 2020-09-20

## 2020-09-20 LAB
ALBUMIN SERPL-MCNC: 3.6 G/DL (ref 3.5–5)
ALBUMIN/GLOB SERPL: 0.8 {RATIO} (ref 1.2–3.5)
ALP SERPL-CCNC: 341 U/L (ref 50–136)
ALT SERPL-CCNC: 291 U/L (ref 12–65)
ANION GAP SERPL CALC-SCNC: 7 MMOL/L (ref 7–16)
AST SERPL-CCNC: 222 U/L (ref 15–37)
ATRIAL RATE: 55 BPM
BASOPHILS # BLD: 0 K/UL (ref 0–0.2)
BASOPHILS NFR BLD: 0 % (ref 0–2)
BILIRUB SERPL-MCNC: 2 MG/DL (ref 0.2–1.1)
BUN SERPL-MCNC: 7 MG/DL (ref 6–23)
CALCIUM SERPL-MCNC: 9.6 MG/DL (ref 8.3–10.4)
CALCULATED P AXIS, ECG09: 27 DEGREES
CALCULATED R AXIS, ECG10: 23 DEGREES
CALCULATED T AXIS, ECG11: 29 DEGREES
CHLORIDE SERPL-SCNC: 106 MMOL/L (ref 98–107)
CO2 SERPL-SCNC: 26 MMOL/L (ref 21–32)
CREAT SERPL-MCNC: 0.88 MG/DL (ref 0.6–1)
DIAGNOSIS, 93000: NORMAL
DIFFERENTIAL METHOD BLD: ABNORMAL
EOSINOPHIL # BLD: 0.1 K/UL (ref 0–0.8)
EOSINOPHIL NFR BLD: 0 % (ref 0.5–7.8)
ERYTHROCYTE [DISTWIDTH] IN BLOOD BY AUTOMATED COUNT: 18.4 % (ref 11.9–14.6)
GLOBULIN SER CALC-MCNC: 4.4 G/DL (ref 2.3–3.5)
GLUCOSE SERPL-MCNC: 191 MG/DL (ref 65–100)
HCG UR QL: NEGATIVE
HCG UR QL: NEGATIVE
HCT VFR BLD AUTO: 45.6 % (ref 35.8–46.3)
HGB BLD-MCNC: 14.3 G/DL (ref 11.7–15.4)
IMM GRANULOCYTES # BLD AUTO: 0 K/UL (ref 0–0.5)
IMM GRANULOCYTES NFR BLD AUTO: 0 % (ref 0–5)
LIPASE SERPL-CCNC: ABNORMAL U/L (ref 73–393)
LYMPHOCYTES # BLD: 0.6 K/UL (ref 0.5–4.6)
LYMPHOCYTES NFR BLD: 5 % (ref 13–44)
MCH RBC QN AUTO: 25.6 PG (ref 26.1–32.9)
MCHC RBC AUTO-ENTMCNC: 31.4 G/DL (ref 31.4–35)
MCV RBC AUTO: 81.6 FL (ref 79.6–97.8)
MONOCYTES # BLD: 0.5 K/UL (ref 0.1–1.3)
MONOCYTES NFR BLD: 3 % (ref 4–12)
NEUTS SEG # BLD: 12.6 K/UL (ref 1.7–8.2)
NEUTS SEG NFR BLD: 91 % (ref 43–78)
NRBC # BLD: 0 K/UL (ref 0–0.2)
P-R INTERVAL, ECG05: 124 MS
PLATELET # BLD AUTO: 501 K/UL (ref 150–450)
PMV BLD AUTO: 10.4 FL (ref 9.4–12.3)
POTASSIUM SERPL-SCNC: 3.5 MMOL/L (ref 3.5–5.1)
PROT SERPL-MCNC: 8 G/DL (ref 6.3–8.2)
Q-T INTERVAL, ECG07: 444 MS
QRS DURATION, ECG06: 84 MS
QTC CALCULATION (BEZET), ECG08: 424 MS
RBC # BLD AUTO: 5.59 M/UL (ref 4.05–5.2)
SODIUM SERPL-SCNC: 139 MMOL/L (ref 136–145)
TROPONIN-HIGH SENSITIVITY: 4 PG/ML (ref 0–14)
VENTRICULAR RATE, ECG03: 55 BPM
WBC # BLD AUTO: 13.8 K/UL (ref 4.3–11.1)

## 2020-09-20 PROCEDURE — 80053 COMPREHEN METABOLIC PANEL: CPT

## 2020-09-20 PROCEDURE — 74011250636 HC RX REV CODE- 250/636: Performed by: NURSE ANESTHETIST, CERTIFIED REGISTERED

## 2020-09-20 PROCEDURE — 77030007288 HC DEV LOK BILI BSC -A: Performed by: INTERNAL MEDICINE

## 2020-09-20 PROCEDURE — 77030012595 HC SPHNTOM BILI BSC -D: Performed by: INTERNAL MEDICINE

## 2020-09-20 PROCEDURE — 74011250637 HC RX REV CODE- 250/637: Performed by: HOSPITALIST

## 2020-09-20 PROCEDURE — 74011250636 HC RX REV CODE- 250/636: Performed by: EMERGENCY MEDICINE

## 2020-09-20 PROCEDURE — 83690 ASSAY OF LIPASE: CPT

## 2020-09-20 PROCEDURE — 74011000250 HC RX REV CODE- 250: Performed by: ANESTHESIOLOGY

## 2020-09-20 PROCEDURE — 74011250636 HC RX REV CODE- 250/636: Performed by: ANESTHESIOLOGY

## 2020-09-20 PROCEDURE — 0FC98ZZ EXTIRPATION OF MATTER FROM COMMON BILE DUCT, VIA NATURAL OR ARTIFICIAL OPENING ENDOSCOPIC: ICD-10-PCS | Performed by: INTERNAL MEDICINE

## 2020-09-20 PROCEDURE — 2709999900 HC NON-CHARGEABLE SUPPLY

## 2020-09-20 PROCEDURE — 84484 ASSAY OF TROPONIN QUANT: CPT

## 2020-09-20 PROCEDURE — 77030009038 HC CATH BILI STN RTVR BSC -C: Performed by: INTERNAL MEDICINE

## 2020-09-20 PROCEDURE — 74011250636 HC RX REV CODE- 250/636: Performed by: FAMILY MEDICINE

## 2020-09-20 PROCEDURE — 81025 URINE PREGNANCY TEST: CPT

## 2020-09-20 PROCEDURE — 99285 EMERGENCY DEPT VISIT HI MDM: CPT

## 2020-09-20 PROCEDURE — 74011000250 HC RX REV CODE- 250: Performed by: NURSE ANESTHETIST, CERTIFIED REGISTERED

## 2020-09-20 PROCEDURE — 77030039425 HC BLD LARYNG TRULITE DISP TELE -A: Performed by: ANESTHESIOLOGY

## 2020-09-20 PROCEDURE — 76705 ECHO EXAM OF ABDOMEN: CPT

## 2020-09-20 PROCEDURE — 85025 COMPLETE CBC W/AUTO DIFF WBC: CPT

## 2020-09-20 PROCEDURE — 71046 X-RAY EXAM CHEST 2 VIEWS: CPT

## 2020-09-20 PROCEDURE — 77030037088 HC TUBE ENDOTRACH ORAL NSL COVD-A: Performed by: ANESTHESIOLOGY

## 2020-09-20 PROCEDURE — 96375 TX/PRO/DX INJ NEW DRUG ADDON: CPT

## 2020-09-20 PROCEDURE — 76040000025: Performed by: INTERNAL MEDICINE

## 2020-09-20 PROCEDURE — 76060000032 HC ANESTHESIA 0.5 TO 1 HR: Performed by: INTERNAL MEDICINE

## 2020-09-20 PROCEDURE — 74011000636 HC RX REV CODE- 636: Performed by: INTERNAL MEDICINE

## 2020-09-20 PROCEDURE — 65270000029 HC RM PRIVATE

## 2020-09-20 PROCEDURE — 74330 X-RAY BILE/PANC ENDOSCOPY: CPT

## 2020-09-20 PROCEDURE — 93005 ELECTROCARDIOGRAM TRACING: CPT | Performed by: EMERGENCY MEDICINE

## 2020-09-20 PROCEDURE — 2709999900 HC NON-CHARGEABLE SUPPLY: Performed by: INTERNAL MEDICINE

## 2020-09-20 PROCEDURE — 96374 THER/PROPH/DIAG INJ IV PUSH: CPT

## 2020-09-20 PROCEDURE — 77030040361 HC SLV COMPR DVT MDII -B: Performed by: INTERNAL MEDICINE

## 2020-09-20 RX ORDER — ESMOLOL HYDROCHLORIDE 10 MG/ML
INJECTION INTRAVENOUS AS NEEDED
Status: DISCONTINUED | OUTPATIENT
Start: 2020-09-20 | End: 2020-09-20 | Stop reason: HOSPADM

## 2020-09-20 RX ORDER — OXYCODONE HYDROCHLORIDE 5 MG/1
10 TABLET ORAL
Status: DISCONTINUED | OUTPATIENT
Start: 2020-09-20 | End: 2020-09-20 | Stop reason: HOSPADM

## 2020-09-20 RX ORDER — SODIUM CHLORIDE, SODIUM LACTATE, POTASSIUM CHLORIDE, CALCIUM CHLORIDE 600; 310; 30; 20 MG/100ML; MG/100ML; MG/100ML; MG/100ML
75 INJECTION, SOLUTION INTRAVENOUS CONTINUOUS
Status: DISCONTINUED | OUTPATIENT
Start: 2020-09-20 | End: 2020-09-20 | Stop reason: HOSPADM

## 2020-09-20 RX ORDER — MIDAZOLAM HYDROCHLORIDE 1 MG/ML
2 INJECTION, SOLUTION INTRAMUSCULAR; INTRAVENOUS
Status: DISCONTINUED | OUTPATIENT
Start: 2020-09-20 | End: 2020-09-20 | Stop reason: HOSPADM

## 2020-09-20 RX ORDER — OXYCODONE HYDROCHLORIDE 5 MG/1
5 TABLET ORAL
Status: DISCONTINUED | OUTPATIENT
Start: 2020-09-20 | End: 2020-09-20 | Stop reason: HOSPADM

## 2020-09-20 RX ORDER — DIPHENHYDRAMINE HYDROCHLORIDE 50 MG/ML
12.5 INJECTION, SOLUTION INTRAMUSCULAR; INTRAVENOUS ONCE
Status: DISCONTINUED | OUTPATIENT
Start: 2020-09-20 | End: 2020-09-20 | Stop reason: HOSPADM

## 2020-09-20 RX ORDER — DEXAMETHASONE SODIUM PHOSPHATE 4 MG/ML
INJECTION, SOLUTION INTRA-ARTICULAR; INTRALESIONAL; INTRAMUSCULAR; INTRAVENOUS; SOFT TISSUE AS NEEDED
Status: DISCONTINUED | OUTPATIENT
Start: 2020-09-20 | End: 2020-09-20 | Stop reason: HOSPADM

## 2020-09-20 RX ORDER — HYDROMORPHONE HYDROCHLORIDE 2 MG/ML
0.5 INJECTION, SOLUTION INTRAMUSCULAR; INTRAVENOUS; SUBCUTANEOUS
Status: DISCONTINUED | OUTPATIENT
Start: 2020-09-20 | End: 2020-09-20 | Stop reason: HOSPADM

## 2020-09-20 RX ORDER — PROMETHAZINE HYDROCHLORIDE 25 MG/ML
INJECTION, SOLUTION INTRAMUSCULAR; INTRAVENOUS
Status: DISCONTINUED
Start: 2020-09-20 | End: 2020-09-20 | Stop reason: WASHOUT

## 2020-09-20 RX ORDER — HEPARIN SODIUM 5000 [USP'U]/ML
5000 INJECTION, SOLUTION INTRAVENOUS; SUBCUTANEOUS EVERY 8 HOURS
Status: DISCONTINUED | OUTPATIENT
Start: 2020-09-20 | End: 2020-09-21 | Stop reason: SDUPTHER

## 2020-09-20 RX ORDER — LIDOCAINE HYDROCHLORIDE 20 MG/ML
INJECTION, SOLUTION EPIDURAL; INFILTRATION; INTRACAUDAL; PERINEURAL AS NEEDED
Status: DISCONTINUED | OUTPATIENT
Start: 2020-09-20 | End: 2020-09-20 | Stop reason: HOSPADM

## 2020-09-20 RX ORDER — SODIUM CHLORIDE, SODIUM LACTATE, POTASSIUM CHLORIDE, CALCIUM CHLORIDE 600; 310; 30; 20 MG/100ML; MG/100ML; MG/100ML; MG/100ML
200 INJECTION, SOLUTION INTRAVENOUS CONTINUOUS
Status: DISCONTINUED | OUTPATIENT
Start: 2020-09-20 | End: 2020-09-22

## 2020-09-20 RX ORDER — ONDANSETRON 2 MG/ML
4 INJECTION INTRAMUSCULAR; INTRAVENOUS ONCE
Status: COMPLETED | OUTPATIENT
Start: 2020-09-20 | End: 2020-09-20

## 2020-09-20 RX ORDER — SODIUM CHLORIDE, SODIUM LACTATE, POTASSIUM CHLORIDE, CALCIUM CHLORIDE 600; 310; 30; 20 MG/100ML; MG/100ML; MG/100ML; MG/100ML
1000 INJECTION, SOLUTION INTRAVENOUS CONTINUOUS
Status: DISCONTINUED | OUTPATIENT
Start: 2020-09-20 | End: 2020-09-20 | Stop reason: HOSPADM

## 2020-09-20 RX ORDER — MORPHINE SULFATE 4 MG/ML
4 INJECTION INTRAVENOUS
Status: COMPLETED | OUTPATIENT
Start: 2020-09-20 | End: 2020-09-20

## 2020-09-20 RX ORDER — NEOSTIGMINE METHYLSULFATE 1 MG/ML
INJECTION, SOLUTION INTRAVENOUS AS NEEDED
Status: DISCONTINUED | OUTPATIENT
Start: 2020-09-20 | End: 2020-09-20 | Stop reason: HOSPADM

## 2020-09-20 RX ORDER — FENTANYL CITRATE 50 UG/ML
100 INJECTION, SOLUTION INTRAMUSCULAR; INTRAVENOUS AS NEEDED
Status: DISCONTINUED | OUTPATIENT
Start: 2020-09-20 | End: 2020-09-20 | Stop reason: HOSPADM

## 2020-09-20 RX ORDER — SODIUM CHLORIDE 9 MG/ML
125 INJECTION, SOLUTION INTRAVENOUS CONTINUOUS
Status: DISCONTINUED | OUTPATIENT
Start: 2020-09-20 | End: 2020-09-20

## 2020-09-20 RX ORDER — PROPOFOL 10 MG/ML
INJECTION, EMULSION INTRAVENOUS AS NEEDED
Status: DISCONTINUED | OUTPATIENT
Start: 2020-09-20 | End: 2020-09-20 | Stop reason: HOSPADM

## 2020-09-20 RX ORDER — LEVOFLOXACIN 5 MG/ML
500 INJECTION, SOLUTION INTRAVENOUS
Status: COMPLETED | OUTPATIENT
Start: 2020-09-20 | End: 2020-09-20

## 2020-09-20 RX ORDER — SODIUM CHLORIDE 0.9 % (FLUSH) 0.9 %
5-40 SYRINGE (ML) INJECTION AS NEEDED
Status: DISCONTINUED | OUTPATIENT
Start: 2020-09-20 | End: 2020-09-24 | Stop reason: HOSPADM

## 2020-09-20 RX ORDER — MORPHINE SULFATE 2 MG/ML
2 INJECTION, SOLUTION INTRAMUSCULAR; INTRAVENOUS ONCE
Status: COMPLETED | OUTPATIENT
Start: 2020-09-20 | End: 2020-09-20

## 2020-09-20 RX ORDER — LIDOCAINE HYDROCHLORIDE 10 MG/ML
0.1 INJECTION INFILTRATION; PERINEURAL AS NEEDED
Status: DISCONTINUED | OUTPATIENT
Start: 2020-09-20 | End: 2020-09-20 | Stop reason: HOSPADM

## 2020-09-20 RX ORDER — ROCURONIUM BROMIDE 10 MG/ML
INJECTION, SOLUTION INTRAVENOUS AS NEEDED
Status: DISCONTINUED | OUTPATIENT
Start: 2020-09-20 | End: 2020-09-20 | Stop reason: HOSPADM

## 2020-09-20 RX ORDER — ONDANSETRON 2 MG/ML
4 INJECTION INTRAMUSCULAR; INTRAVENOUS ONCE
Status: DISCONTINUED | OUTPATIENT
Start: 2020-09-20 | End: 2020-09-20 | Stop reason: HOSPADM

## 2020-09-20 RX ORDER — SODIUM CHLORIDE 0.9 % (FLUSH) 0.9 %
5-40 SYRINGE (ML) INJECTION EVERY 8 HOURS
Status: DISCONTINUED | OUTPATIENT
Start: 2020-09-20 | End: 2020-09-24 | Stop reason: HOSPADM

## 2020-09-20 RX ORDER — MORPHINE SULFATE 2 MG/ML
2 INJECTION, SOLUTION INTRAMUSCULAR; INTRAVENOUS
Status: DISCONTINUED | OUTPATIENT
Start: 2020-09-20 | End: 2020-09-22

## 2020-09-20 RX ORDER — HYDRALAZINE HYDROCHLORIDE 25 MG/1
25 TABLET, FILM COATED ORAL
Status: DISCONTINUED | OUTPATIENT
Start: 2020-09-20 | End: 2020-09-24 | Stop reason: HOSPADM

## 2020-09-20 RX ORDER — FENTANYL CITRATE 50 UG/ML
INJECTION, SOLUTION INTRAMUSCULAR; INTRAVENOUS AS NEEDED
Status: DISCONTINUED | OUTPATIENT
Start: 2020-09-20 | End: 2020-09-20 | Stop reason: HOSPADM

## 2020-09-20 RX ORDER — ONDANSETRON 2 MG/ML
4 INJECTION INTRAMUSCULAR; INTRAVENOUS
Status: COMPLETED | OUTPATIENT
Start: 2020-09-20 | End: 2020-09-20

## 2020-09-20 RX ORDER — GLYCOPYRROLATE 0.2 MG/ML
INJECTION INTRAMUSCULAR; INTRAVENOUS AS NEEDED
Status: DISCONTINUED | OUTPATIENT
Start: 2020-09-20 | End: 2020-09-20 | Stop reason: HOSPADM

## 2020-09-20 RX ORDER — SUCCINYLCHOLINE CHLORIDE 20 MG/ML
INJECTION INTRAMUSCULAR; INTRAVENOUS AS NEEDED
Status: DISCONTINUED | OUTPATIENT
Start: 2020-09-20 | End: 2020-09-20 | Stop reason: HOSPADM

## 2020-09-20 RX ORDER — NALOXONE HYDROCHLORIDE 0.4 MG/ML
0.1 INJECTION, SOLUTION INTRAMUSCULAR; INTRAVENOUS; SUBCUTANEOUS AS NEEDED
Status: DISCONTINUED | OUTPATIENT
Start: 2020-09-20 | End: 2020-09-20 | Stop reason: HOSPADM

## 2020-09-20 RX ADMIN — FENTANYL CITRATE 50 MCG: 50 INJECTION INTRAMUSCULAR; INTRAVENOUS at 10:42

## 2020-09-20 RX ADMIN — LIDOCAINE HYDROCHLORIDE 80 MG: 20 INJECTION, SOLUTION EPIDURAL; INFILTRATION; INTRACAUDAL; PERINEURAL at 10:26

## 2020-09-20 RX ADMIN — ONDANSETRON 4 MG: 2 INJECTION INTRAMUSCULAR; INTRAVENOUS at 09:04

## 2020-09-20 RX ADMIN — PROMETHAZINE HYDROCHLORIDE 6.25 MG: 25 INJECTION INTRAMUSCULAR; INTRAVENOUS at 10:18

## 2020-09-20 RX ADMIN — HYDRALAZINE HYDROCHLORIDE 25 MG: 25 TABLET, FILM COATED ORAL at 15:49

## 2020-09-20 RX ADMIN — MORPHINE SULFATE 2 MG: 2 INJECTION, SOLUTION INTRAMUSCULAR; INTRAVENOUS at 06:03

## 2020-09-20 RX ADMIN — Medication 1 MG: at 10:54

## 2020-09-20 RX ADMIN — PROPOFOL 200 MG: 10 INJECTION, EMULSION INTRAVENOUS at 10:26

## 2020-09-20 RX ADMIN — SODIUM CHLORIDE, SODIUM LACTATE, POTASSIUM CHLORIDE, AND CALCIUM CHLORIDE 200 ML/HR: 600; 310; 30; 20 INJECTION, SOLUTION INTRAVENOUS at 14:14

## 2020-09-20 RX ADMIN — SODIUM CHLORIDE, SODIUM LACTATE, POTASSIUM CHLORIDE, AND CALCIUM CHLORIDE 1000 ML: 600; 310; 30; 20 INJECTION, SOLUTION INTRAVENOUS at 08:57

## 2020-09-20 RX ADMIN — ONDANSETRON 4 MG: 2 INJECTION INTRAMUSCULAR; INTRAVENOUS at 04:12

## 2020-09-20 RX ADMIN — FENTANYL CITRATE 100 MCG: 50 INJECTION INTRAMUSCULAR; INTRAVENOUS at 10:29

## 2020-09-20 RX ADMIN — LEVOFLOXACIN 500 MG: 5 INJECTION, SOLUTION INTRAVENOUS at 09:59

## 2020-09-20 RX ADMIN — MORPHINE SULFATE 2 MG: 2 INJECTION, SOLUTION INTRAMUSCULAR; INTRAVENOUS at 18:01

## 2020-09-20 RX ADMIN — Medication 10 ML: at 14:14

## 2020-09-20 RX ADMIN — IOPAMIDOL 15 ML: 755 INJECTION, SOLUTION INTRAVENOUS at 10:46

## 2020-09-20 RX ADMIN — SUCCINYLCHOLINE CHLORIDE 160 MG: 20 INJECTION, SOLUTION INTRAMUSCULAR; INTRAVENOUS at 10:26

## 2020-09-20 RX ADMIN — SODIUM CHLORIDE 500 ML: 900 INJECTION, SOLUTION INTRAVENOUS at 04:12

## 2020-09-20 RX ADMIN — MORPHINE SULFATE 4 MG: 4 INJECTION INTRAVENOUS at 04:12

## 2020-09-20 RX ADMIN — GLYCOPYRROLATE 0.3 MG: 0.2 INJECTION, SOLUTION INTRAMUSCULAR; INTRAVENOUS at 10:54

## 2020-09-20 RX ADMIN — ONDANSETRON 4 MG: 2 INJECTION INTRAMUSCULAR; INTRAVENOUS at 06:02

## 2020-09-20 RX ADMIN — SUCCINYLCHOLINE CHLORIDE 50 MG: 20 INJECTION, SOLUTION INTRAMUSCULAR; INTRAVENOUS at 11:01

## 2020-09-20 RX ADMIN — Medication 10 ML: at 08:13

## 2020-09-20 RX ADMIN — ROCURONIUM BROMIDE 5 MG: 10 INJECTION, SOLUTION INTRAVENOUS at 10:26

## 2020-09-20 RX ADMIN — DEXAMETHASONE SODIUM PHOSPHATE 10 MG: 4 INJECTION, SOLUTION INTRAMUSCULAR; INTRAVENOUS at 10:41

## 2020-09-20 RX ADMIN — SODIUM CHLORIDE 125 ML/HR: 900 INJECTION, SOLUTION INTRAVENOUS at 05:43

## 2020-09-20 RX ADMIN — ESMOLOL HYDROCHLORIDE 30 MG: 10 INJECTION, SOLUTION INTRAVENOUS at 10:54

## 2020-09-20 NOTE — ED TRIAGE NOTES
Arrives without face mask to triage. Arrives via wheelchair with reports substernal non-radiating chest pain. Onset yesterday states constant since onset. Describes as \"tightness\". Reports associated n/v. Denies diarrhea, cough, fever, urinary symptoms. attempted pepto bismol without relief. Notes does have gallstones.

## 2020-09-20 NOTE — ANESTHESIA POSTPROCEDURE EVALUATION
Procedure(s):  ENDOSCOPIC RETROGRADE CHOLANGIOPANCREATOGRAPHY (ERCP)  ENDOSCOPIC SPHINCTEROTOMY  ENDOSCOPIC STONE EXTRACTION/BALLOON SWEEP. general    Anesthesia Post Evaluation      Multimodal analgesia: multimodal analgesia used between 6 hours prior to anesthesia start to PACU discharge  Patient location during evaluation: bedside  Patient participation: complete - patient participated  Level of consciousness: responsive to verbal stimuli  Pain management: adequate  Airway patency: patent  Anesthetic complications: no  Cardiovascular status: hemodynamically stable  Respiratory status: spontaneous ventilation  Hydration status: stable  Comments: AOx3, comfortable with no complaints. Ms. Keren Kirkpatrick did have laryngospasm shortly after being extubated but this was noted immediately. She did not break with positive pressure and given low dose anectine. She began breathing without complication, returned to 100% within 90 seconds, and was monitored extensively shortly thereafter. Breathing well, clear lung fields, oxygenating well on minimal oxygen. Will continue being monitored in house with continuous pulse oximetry         INITIAL Post-op Vital signs:   Vitals Value Taken Time   /72 9/20/2020 11:24 AM   Temp 36.7 °C (98 °F) 9/20/2020 11:17 AM   Pulse 64 9/20/2020 11:25 AM   Resp 15 9/20/2020 11:17 AM   SpO2 100 % 9/20/2020 11:25 AM   Vitals shown include unvalidated device data.

## 2020-09-20 NOTE — PROGRESS NOTES
TRANSFER - OUT REPORT:    Verbal report given to RAMON Ordonez(name) on Riya Leeanne  being transferred to preop (unit) for ordered procedure       Report consisted of patients Situation, Background, Assessment and   Recommendations(SBAR). Information from the following report(s) SBAR, Kardex, Intake/Output, MAR and Recent Results was reviewed with the receiving nurse. Lines:   Peripheral IV 09/20/20 Left Antecubital (Active)   Site Assessment Clean, dry, & intact 09/20/20 0325   Phlebitis Assessment 0 09/20/20 0325   Infiltration Assessment 0 09/20/20 0325   Dressing Status Clean, dry, & intact 09/20/20 0325   Dressing Type Transparent 09/20/20 0325   Hub Color/Line Status Green 09/20/20 0325        Opportunity for questions and clarification was provided.       Patient transported with:

## 2020-09-20 NOTE — ED NOTES
TRANSFER - OUT REPORT:    Verbal report given to RN on [facility]  being transferred to [phone] for routine progression of care       Report consisted of patients Situation, Background, Assessment and   Recommendations(SBAR). Information from the following report(s) SBAR, ED Summary and MAR was reviewed with the receiving nurse. Lines:   Peripheral IV 09/20/20 Left Antecubital (Active)   Site Assessment Clean, dry, & intact 09/20/20 0325   Phlebitis Assessment 0 09/20/20 0325   Infiltration Assessment 0 09/20/20 0325   Dressing Status Clean, dry, & intact 09/20/20 0325   Dressing Type Transparent 09/20/20 0325   Hub Color/Line Status Green 09/20/20 0325        Opportunity for questions and clarification was provided.       Patient transported with:   TRAKLOK

## 2020-09-20 NOTE — CONSULTS
H&P/Consult Note/Progress Note/Office Note:   Veronika Zarate  MRN: [id number]  :1995  Age:25 y.o. General Surgery Consult ordered by: Dr. Lisette Thomas  Reason for General Surgery Consult: Gallstones    HPI: Veronika Zarate is a 30 y.o. female with a past medical hx of Obesity, GERD, hyperlipidemia, and gallstones who presented to the ED 20 with c/o epigastric pain along with nausea and vomiting. Pt reports the epigastric pain began the previous day, however, states she has been having nausea and vomiting for the past 3 weeks. WBC 13.8, plts 501. , , Lipase ~59240    Pt was seen in Urgent Care on  for nausea and vomiting and was prescribed antibiotics. She states she did not improve and went to [facility] ED . Gallstones were noted on CT A/P. Past Medical History:   Diagnosis Date    Cellulitis     GERD (gastroesophageal reflux disease)     Migraine headache     Obesity     Otitis media     UTI (urinary tract infection)      Past Surgical History:   Procedure Laterality Date    ERCP  2020         HX GYN          HX HEENT      tonsils-adenoids     Current Facility-Administered Medications   Medication Dose Route Frequency    sodium chloride (NS) flush 5-40 mL  5-40 mL IntraVENous Q8H    sodium chloride (NS) flush 5-40 mL  5-40 mL IntraVENous PRN    morphine injection 2 mg  2 mg IntraVENous Q3H PRN    heparin (porcine) injection 5,000 Units  5,000 Units SubCUTAneous Q8H    lactated Ringers infusion  200 mL/hr IntraVENous CONTINUOUS    lactated ringers bolus infusion 1,000 mL  1,000 mL IntraVENous ONCE     Patient has no known allergies.   Social History     Socioeconomic History    Marital status: SINGLE     Spouse name: Not on file    Number of children: Not on file    Years of education: Not on file    Highest education level: Not on file   Tobacco Use    Smoking status: Never Smoker    Smokeless tobacco: Never Used   Substance and Sexual Activity    Alcohol use: No    Drug use: No     Social History     Tobacco Use   Smoking Status Never Smoker   Smokeless Tobacco Never Used     History reviewed. No pertinent family history. ROS: The patient has no difficulty with chest pain or shortness of breath. No fever or chills. Comprehensive review of systems was otherwise unremarkable except as noted above. Physical Exam:   Visit Vitals  /78 (BP 1 Location: Right arm, BP Patient Position: At rest)   Pulse 69   Temp 97.9 °F (36.6 °C)   Resp 22   Ht 5' 3\" (1.6 m)   Wt 269 lb (122 kg)   SpO2 98%   BMI 47.65 kg/m²     Vitals:    09/20/20 1219 09/20/20 1224 09/20/20 1229 09/20/20 1255   BP: 139/84 (!) 141/84 (!) 144/86 135/78   Pulse: 65 62 61 69   Resp: 16 16 16 22   Temp:    97.9 °F (36.6 °C)   SpO2: 100% 100% 100% 98%   Weight:       Height:         09/20 0701 - 09/20 1900  In: 500 [I.V.:500]  Out: -   09/18 1901 - 09/20 0700  In: 500 [I.V.:500]  Out: -     Constitutional: Alert, oriented, cooperative patient in no acute distress; appears stated age    Eyes: Sclera are clear. EOMs intact  ENMT: no external lesions gross hearing normal; no obvious neck masses, no ear or lip lesions, nares normal  CV: RRR. Normal perfusion  Resp: No JVD. Breathing is  non-labored; no audible wheezing. GI: obese, soft and non-distended, non-tender    Musculoskeletal: unremarkable with normal function. No embolic signs or cyanosis.    Neuro:  Oriented; moves all 4; no focal deficits  Psychiatric: normal affect and mood, no memory impairment    Recent vitals (if inpt):  Patient Vitals for the past 24 hrs:   BP Temp Pulse Resp SpO2 Height Weight   09/20/20 1255 135/78 97.9 °F (36.6 °C) 69 22 98 %     09/20/20 1229 (!) 144/86  61 16 100 %     09/20/20 1224 (!) 141/84  62 16 100 %     09/20/20 1219 139/84  65 16 100 %     09/20/20 1214 134/85  62 16 99 %     09/20/20 1209 (!) 150/82 98 °F (36.7 °C) 68 16 99 %     09/20/20 1204 (!) 147/83  66 16 99 %     09/20/20 1159 (!) 144/79  60 16 100 %     09/20/20 1154 (!) 140/82  62 16 100 %     09/20/20 1149 (!) 145/83  61 16 100 %     09/20/20 1144 (!) 141/80  62 16 100 %     09/20/20 1139 (!) 147/82  61 16 100 %     09/20/20 1134 (!) 140/83  (!) 58 16 100 %     09/20/20 1129 (!) 141/86  63 16 99 %     09/20/20 1124 (!) 142/72  65 15 100 %     09/20/20 1123 (!) 150/86  73 15 100 %     09/20/20 1117 (!) 149/70 98 °F (36.7 °C) 76 15 98 %     09/20/20 1116 (!) 149/70 98 °F (36.7 °C) 74 15 98 %     09/20/20 1027 (!) 168/112  (!) 116 (!) 2 100 %     09/20/20 0900 (!) 109/56  (!) 102       09/20/20 0852 (!) 161/83 97.8 °F (36.6 °C) (!) 54 20 100 %     09/20/20 0728 (!) 149/96 97.9 °F (36.6 °C) (!) 53 20 96 %     09/20/20 0710 (!) 178/83    100 %     09/20/20 0629 (!) 172/79    95 %     09/20/20 0622 (!) 173/100 97.7 °F (36.5 °C)  16 99 %     09/20/20 0606 (!) 173/100 97.7 °F (36.5 °C)  16 99 %     09/20/20 0429 (!) 169/101    97 %     09/20/20 0428     97 %     09/20/20 0411 (!) 160/90  64  100 %     09/20/20 0253 139/88 97.7 °F (36.5 °C) (!) 58 16 100 % 5' 3\" (1.6 m) 269 lb (122 kg)       Labs:  Recent Labs     09/20/20  0323   WBC 13.8*   HGB 14.3   *      K 3.5      CO2 26   BUN 7   CREA 0.88   *   TBILI 2.0*   *   *   LPSE 27,174*       Lab Results   Component Value Date/Time    WBC 13.8 (H) 09/20/2020 03:23 AM    HGB 14.3 09/20/2020 03:23 AM    PLATELET 101 (H) 74/63/4118 03:23 AM    Sodium 139 09/20/2020 03:23 AM    Potassium 3.5 09/20/2020 03:23 AM    Chloride 106 09/20/2020 03:23 AM    CO2 26 09/20/2020 03:23 AM    BUN 7 09/20/2020 03:23 AM    Creatinine 0.88 09/20/2020 03:23 AM    Glucose 191 (H) 09/20/2020 03:23 AM    Bilirubin, total 2.0 (H) 09/20/2020 03:23 AM    ALT (SGPT) 291 (H) 09/20/2020 03:23 AM    Alk.  phosphatase 341 (H) 09/20/2020 03:23 AM    Lipase 27,174 (H) 09/20/2020 03:23 AM       CT Results  (Last 48 hours)    None        chest X-ray      I reviewed recent labs, recent radiologic studies, and pertinent records including other doctor notes if needed. I independently reviewed radiology images for studies I described above or studies I have ordered. Admission date (for inpatients): 2020   Day of Surgery  Procedure(s):  ENDOSCOPIC RETROGRADE CHOLANGIOPANCREATOGRAPHY (ERCP)  ENDOSCOPIC SPHINCTEROTOMY  ENDOSCOPIC STONE EXTRACTION/BALLOON SWEEP    ASSESSMENT/PLAN:  Problem List  Never Reviewed          Codes Class Noted    Acute pancreatitis due to calculus of common bile duct ICD-10-CM: K85.10  ICD-9-CM: 103.4, 574.50  2020        Non-intractable vomiting with nausea ICD-10-CM: R11.2  ICD-9-CM: 787.01  2020        Transaminitis ICD-10-CM: R74.0  ICD-9-CM: 790.4  2020        Hyperbilirubinemia ICD-10-CM: E80.6  ICD-9-CM: 782.4  2020        Choledocholithiasis ICD-10-CM: K80.50  ICD-9-CM: 574.50  2020        Chronic hypertension ICD-10-CM: I10  ICD-9-CM: 401.9  2019    Overview Signed 2020  6:39 AM by Shreya Avalos DO     Last Assessment & Plan:   29 yo [id number] PPD#1 from  complicated by CHTN/MADDY  - BPs remain normotensive  - s/p 24h mag recovery  - AM labs wnl with exception of leukocytosis (17k) likely secondary to delivery   - stable for discharge  - f/u in clinic on  for BP check             GERD (gastroesophageal reflux disease) ICD-10-CM: K21.9  ICD-9-CM: 530.81  Unknown            Active Problems:    Acute pancreatitis due to calculus of common bile duct (2020)      Non-intractable vomiting with nausea (2020)      Transaminitis (2020)      Hyperbilirubinemia (2020)      Choledocholithiasis (2020)         Plan:  Care Management per Hospitalist  GI Following  --ERCP 20  General Surgery will follow. Will need gallbladder out. Timing pf cholecystectomy per Dr. Kanu Nuno.    Follow labs      Dana Peña NP

## 2020-09-20 NOTE — CONSULTS
Subjective:      Referring Physician :Ian Soto  Primary GI :new    Chief Complaint : abd pain    History of Present Illness :Is a 49-year-old female presenting to the emergency department today complaining of chest pain but she points to the epigastric region. The patient states that she has had nausea and vomiting which is been ongoing for over 3 weeks. The patient was seen and evaluated on 2020 [facility] for nausea and vomiting that time she had a CT abdomen pelvis done which did show stones in the gallbladder. The patient says that she also has a known history of high cholesterol and triglycerides. She is never had a history of pancreatitis previously. The patient says that the pain is severe but intermittent. She has a Zofran she has been taking at home without any relief of her nausea and vomiting.       Pain- epigastric - not to back  Bleeding- no  Bowel movements- none  Nausea- yes  Vomiting- yes  Dysphagia- no  ASA/ NSAIDS- no  Anticoagulants- no  Wt loss- no  Past Medical History:   Diagnosis Date    Cellulitis     GERD (gastroesophageal reflux disease)     Migraine headache     Obesity     Otitis media     UTI (urinary tract infection)      Past Surgical History:   Procedure Laterality Date    HX GYN          HX HEENT      tonsils-adenoids     History reviewed. No pertinent family history. Current Facility-Administered Medications   Medication Dose Route Frequency    levoFLOXacin (LEVAQUIN) 500 mg in D5W IVPB  500 mg IntraVENous NOW    lactated Ringers infusion  200 mL/hr IntraVENous CONTINUOUS    lactated ringers bolus infusion 1,000 mL  1,000 mL IntraVENous ONCE     Current Outpatient Medications   Medication Sig    ondansetron (ZOFRAN ODT) 4 mg disintegrating tablet Take 1 Tab by mouth every eight (8) hours as needed for Nausea.  methocarbamol (ROBAXIN) 750 mg tablet Take 1 Tab by mouth three (3) times daily as needed.     traMADol (ULTRAM) 50 mg tablet Take 1 Tab by mouth every six (6) hours as needed for Pain (One of two scripts). Max Daily Amount: 200 mg.     Prior to Admission medications    Medication Sig Start Date End Date Taking? Authorizing Provider   ondansetron (ZOFRAN ODT) 4 mg disintegrating tablet Take 1 Tab by mouth every eight (8) hours as needed for Nausea. 3/4/19   Christopher Yarbrough MD   methocarbamol (ROBAXIN) 750 mg tablet Take 1 Tab by mouth three (3) times daily as needed. 11/25/18   Neetu Escamilla PA   traMADol (ULTRAM) 50 mg tablet Take 1 Tab by mouth every six (6) hours as needed for Pain (One of two scripts). Max Daily Amount: 200 mg. 11/25/18   Neetu Escamilla PA     No Known Allergies  Social History     Occupational History    Not on file   Tobacco Use    Smoking status: Never Smoker    Smokeless tobacco: Never Used   Substance and Sexual Activity    Alcohol use: No    Drug use: No    Sexual activity: Not on file       Review of Systems: The rest of 10 organ review of systems is negative or as per HPI and PMH. Objective:     Physical Exam:     Patient Vitals for the past 8 hrs:   BP Temp Pulse Resp SpO2 Height Weight   09/20/20 0629 (!) 172/79    95 %     09/20/20 0622 (!) 173/100 97.7 °F (36.5 °C)  16 99 %     09/20/20 0606 (!) 173/100 97.7 °F (36.5 °C)  16 99 %     09/20/20 0429 (!) 169/101    97 %     09/20/20 0428     97 %     09/20/20 0411 (!) 160/90  64  100 %     09/20/20 0253 139/88 97.7 °F (36.5 °C) (!) 58 16 100 % 5' 3\" (1.6 m) 122 kg (269 lb)     General: obese female in moderate distress but better after pain meds  Skin:  Extremities and face reveal no rashes. No gomez erythema. No telangiectasias on the chest wall. HEENT: Sclerae anicteric. No oral ulcers. No abnormal pigmentation of the lips. Cardiovascular: Regular rate and rhythm. No murmurs, gallops, or rubs. PMI nondisplaced. Carotids without bruits. Respiratory:  Comfortable breathing  With no accessory muscle use. Clear breath sounds with no rales bruits. GI:  Abdomen nondistended, soft, and tender. No rebound. Normal active bowel sounds. No enlargement of the liver or spleen. No masses palpable. Rectal:  Deferred  Musculoskeletal:  No pitting edema of the lower legs. The neck is supple and extremities have good range of motion. No costovertebral tenderness. Neurological:  Gross memory appears intact. Patient is alert and oriented. Psychiatric:  Mood appears appropriate with judgement intact. Lymphatic:  No cervical or supraclavicular adenopathy.     Laboratory:    Lab Results   Component Value Date/Time    WBC 13.8 (H) 09/20/2020 03:23 AM    RBC 5.59 (H) 09/20/2020 03:23 AM    HGB 14.3 09/20/2020 03:23 AM    HCT 45.6 09/20/2020 03:23 AM    PLATELET 760 (H) 01/99/3620 03:23 AM     Lab Results   Component Value Date/Time    Sodium 139 09/20/2020 03:23 AM    Potassium 3.5 09/20/2020 03:23 AM    Chloride 106 09/20/2020 03:23 AM    CO2 26 09/20/2020 03:23 AM    Anion gap 7 09/20/2020 03:23 AM    Glucose 191 (H) 09/20/2020 03:23 AM    BUN 7 09/20/2020 03:23 AM    Creatinine 0.88 09/20/2020 03:23 AM    GFR est AA >60 09/20/2020 03:23 AM    GFR est non-AA >60 09/20/2020 03:23 AM    Calcium 9.6 09/20/2020 03:23 AM    Albumin 3.6 09/20/2020 03:23 AM    Bilirubin, total 2.0 (H) 09/20/2020 03:23 AM    Protein, total 8.0 09/20/2020 03:23 AM    Globulin 4.4 (H) 09/20/2020 03:23 AM    A-G Ratio 0.8 (L) 09/20/2020 03:23 AM    ALT (SGPT) 291 (H) 09/20/2020 03:23 AM          Assessment/Plan:       Hospital Problems  Never Reviewed          Codes Class Noted POA    Acute pancreatitis due to calculus of common bile duct ICD-10-CM: K85.10  ICD-9-CM: 577.0, 574.50  9/20/2020 Yes        Non-intractable vomiting with nausea ICD-10-CM: R11.2  ICD-9-CM: 787.01  9/20/2020 Unknown        Transaminitis ICD-10-CM: R74.0  ICD-9-CM: 790.4  9/20/2020 Unknown        Hyperbilirubinemia ICD-10-CM: E80.6  ICD-9-CM: 782.4  9/20/2020 Unknown Cholangitis ICD-10-CM: K83.09  ICD-9-CM: 576.1  9/20/2020 Yes        Choledocholithiasis ICD-10-CM: K80.50  ICD-9-CM: 574.50  9/20/2020 Yes          dx as above  May have pancreatitis  Likely cbd stone  Possible cholangitis    ERCP this AM

## 2020-09-20 NOTE — PROGRESS NOTES
Shift assessment complete, see flow sheets for details. Safety measures in place, call light within reach.

## 2020-09-20 NOTE — PERIOP NOTES
TRANSFER - OUT REPORT:    Verbal report given to Suzie Boggs RN on Dawna Rosas  being transferred to [phone] for routine post - op       Report consisted of patients Situation, Background, Assessment and   Recommendations(SBAR). Information from the following report(s) SBAR, OR Summary, Procedure Summary, Intake/Output, MAR and Recent Results was reviewed with the receiving nurse. Lines:   Peripheral IV 09/20/20 Left Antecubital (Active)   Site Assessment Clean, dry, & intact 09/20/20 1116   Phlebitis Assessment 0 09/20/20 1116   Infiltration Assessment 0 09/20/20 1116   Dressing Status Clean, dry, & intact 09/20/20 1116   Dressing Type Tape;Transparent 09/20/20 1116   Hub Color/Line Status Patent 09/20/20 1116   Alcohol Cap Used No 09/20/20 0854        Opportunity for questions and clarification was provided. Patient transported with:   O2 @ 2 liters    VTE prophylaxis orders have been written for Dawna Rosas. Patient and family given floor number and nurses name. Family updated re: pt status after security code verified. Pt has been signed out by MD Prince of anesthesia.

## 2020-09-20 NOTE — PROGRESS NOTES
Pt complains of burning epigastric pain 6/10. PRN Morphine 2 mg given slow, IVP per MD order. Safety measures in place, call light within reach. Will continue to monitor.

## 2020-09-20 NOTE — H&P
HOSPITALIST H&P/CONSULT  NAME:  Marilou Vazquez   Age:  30 y.o.  :   1995   MRN:   [id number]  PCP: None  Consulting MD:  Treatment Team: Attending Provider: Brooks Simpson DO; Primary Nurse: Darylene Ferry, RN  HPI:   Patient is a 30year old with PMHx of GERD p/w epigastric tenderness and vomiting. Pt stated that she started to have constant mid-epigastric pain starting yesterday associated with worsening vomiting. She has been having N/V in the past 3 weeks. Pt was seen at an  on  for N/V and was prescribed antibiotics. She did not improve and went to [facility] ED the next day. Gallstones were noted on CT A/P. She was given Haldol, Zofran and IVF then with resolution in symptoms and was discharged to home. Pt stated she has not had this type of epigastric pain before. Pain does not radiate. Denies fever or chills. No alleviating factors. Denies alcohol use or family hx of pancreatitis. Does not smoke and does not take any medications at home besides Zofran. Denies SOB, chest pain, back pain, diarrhea, constipation, weakness, peripheral edema. In ED, pt was hypertensive most likely 2/2 pain. WBC 13.8, plts 501. , , Lipase ~21677. Abdominal US shows cholelithiasis w/o cholecystitis; dilated CBD (1.4cm); no evidence of peripancreatic fluid collection. Case was discussed with GI Dr. Smith People by ED attending who will plan to see patient in AM and plans on doing ERCP. He also recommended for pt to be on Levaquin 500mg IV. In ED, she was given 500mL bolus NS.     Complete ROS done and is as stated in HPI or otherwise negative  Past Medical History:   Diagnosis Date    Cellulitis     GERD (gastroesophageal reflux disease)     Migraine headache     Obesity     Otitis media     UTI (urinary tract infection)       Past Surgical History:   Procedure Laterality Date    HX GYN          HX HEENT      tonsils-adenoids     Prior to Admission Medications   Prescriptions Last Dose Informant Patient Reported? Taking? methocarbamol (ROBAXIN) 750 mg tablet   No No   Sig: Take 1 Tab by mouth three (3) times daily as needed. ondansetron (ZOFRAN ODT) 4 mg disintegrating tablet   No No   Sig: Take 1 Tab by mouth every eight (8) hours as needed for Nausea. traMADol (ULTRAM) 50 mg tablet   No No   Sig: Take 1 Tab by mouth every six (6) hours as needed for Pain (One of two scripts). Max Daily Amount: 200 mg. Facility-Administered Medications: None     No Known Allergies   Social History     Tobacco Use    Smoking status: Never Smoker    Smokeless tobacco: Never Used   Substance Use Topics    Alcohol use: No      History reviewed. No pertinent family history. No family hx of gallstones or pancreatitis  Objective:     Visit Vitals  BP (!) 173/100   Pulse 64   Temp 97.7 °F (36.5 °C)   Resp 16   Ht 5' 3\" (1.6 m)   Wt 122 kg (269 lb)   SpO2 99%   BMI 47.65 kg/m²      Temp (24hrs), Av.7 °F (36.5 °C), Min:97.7 °F (36.5 °C), Max:97.7 °F (36.5 °C)    Oxygen Therapy  O2 Sat (%): 99 % (20 0622)  Pulse via Oximetry: 62 beats per minute (20 0606)  O2 Device: Room air (20 5495)  Physical Exam:  General:    Alert, cooperative, appears stated age. Uncomfortable d/t pain  Head:   Normocephalic, without obvious abnormality, atraumatic. Mouth:  Dry oral mucosa  Nose:  Nares normal. No drainage or sinus tenderness. Lungs:   Clear to auscultation bilaterally. No Wheezing or Rhonchi. No rales. Heart:   Regular rate and rhythm,  no murmur, rub or gallop. Abdomen:   Soft. Not distended. Bowel sounds normal. TTP mostly in upper quadrants of abdomen, worse around mid-epigastric region. No rebound or guarding. Extremities: No cyanosis. No edema. No clubbing  Skin:     Texture, turgor normal. No rashes or lesions. Not Jaundiced.  Cap refill 2-3s  Neurologic: Alert and oriented x 3, no focal deficits   Data Review:   Recent Results (from the past 24 hour(s))   EKG, 12 LEAD, INITIAL Collection Time: 09/20/20  2:57 AM   Result Value Ref Range    Ventricular Rate 55 BPM    Atrial Rate 55 BPM    P-R Interval 124 ms    QRS Duration 84 ms    Q-T Interval 444 ms    QTC Calculation (Bezet) 424 ms    Calculated P Axis 27 degrees    Calculated R Axis 23 degrees    Calculated T Axis 29 degrees    Diagnosis       Sinus bradycardia  Otherwise normal ECG  When compared with ECG of 16-APR-2014 06:39,  T wave amplitude has decreased in Anterior leads     CBC WITH AUTOMATED DIFF    Collection Time: 09/20/20  3:23 AM   Result Value Ref Range    WBC 13.8 (H) 4.3 - 11.1 K/uL    RBC 5.59 (H) 4.05 - 5.2 M/uL    HGB 14.3 11.7 - 15.4 g/dL    HCT 45.6 35.8 - 46.3 %    MCV 81.6 79.6 - 97.8 FL    MCH 25.6 (L) 26.1 - 32.9 PG    MCHC 31.4 31.4 - 35.0 g/dL    RDW 18.4 (H) 11.9 - 14.6 %    PLATELET 424 (H) 001 - 450 K/uL    MPV 10.4 9.4 - 12.3 FL    ABSOLUTE NRBC 0.00 0.0 - 0.2 K/uL    DF AUTOMATED      NEUTROPHILS 91 (H) 43 - 78 %    LYMPHOCYTES 5 (L) 13 - 44 %    MONOCYTES 3 (L) 4.0 - 12.0 %    EOSINOPHILS 0 (L) 0.5 - 7.8 %    BASOPHILS 0 0.0 - 2.0 %    IMMATURE GRANULOCYTES 0 0.0 - 5.0 %    ABS. NEUTROPHILS 12.6 (H) 1.7 - 8.2 K/UL    ABS. LYMPHOCYTES 0.6 0.5 - 4.6 K/UL    ABS. MONOCYTES 0.5 0.1 - 1.3 K/UL    ABS. EOSINOPHILS 0.1 0.0 - 0.8 K/UL    ABS. BASOPHILS 0.0 0.0 - 0.2 K/UL    ABS. IMM. GRANS. 0.0 0.0 - 0.5 K/UL   METABOLIC PANEL, COMPREHENSIVE    Collection Time: 09/20/20  3:23 AM   Result Value Ref Range    Sodium 139 136 - 145 mmol/L    Potassium 3.5 3.5 - 5.1 mmol/L    Chloride 106 98 - 107 mmol/L    CO2 26 21 - 32 mmol/L    Anion gap 7 7 - 16 mmol/L    Glucose 191 (H) 65 - 100 mg/dL    BUN 7 6 - 23 MG/DL    Creatinine 0.88 0.6 - 1.0 MG/DL    GFR est AA >60 >60 ml/min/1.73m2    GFR est non-AA >60 >60 ml/min/1.73m2    Calcium 9.6 8.3 - 10.4 MG/DL    Bilirubin, total 2.0 (H) 0.2 - 1.1 MG/DL    ALT (SGPT) 291 (H) 12 - 65 U/L    AST (SGOT) 222 (H) 15 - 37 U/L    Alk.  phosphatase 341 (H) 50 - 136 U/L    Protein, total 8.0 6.3 - 8.2 g/dL    Albumin 3.6 3.5 - 5.0 g/dL    Globulin 4.4 (H) 2.3 - 3.5 g/dL    A-G Ratio 0.8 (L) 1.2 - 3.5     TROPONIN-HIGH SENSITIVITY    Collection Time: 09/20/20  3:23 AM   Result Value Ref Range    Troponin-High Sensitivity 4.0 0 - 14 pg/mL   LIPASE    Collection Time: 09/20/20  3:23 AM   Result Value Ref Range    Lipase 27,174 (H) 73 - 393 U/L     Imaging /Procedures /Studies   US ABD LTD   Final Result   IMPRESSION:      1. Cholelithiasis, without sonographic evidence of acute cholecystitis. 2. Dilated common bile duct (up to 1.4 cm in diameter). Finding may be may be   secondary to choledocholithiasis, but no obstructive etiology is identified on   this exam.      3. Pancreas not well seen due to overlying bowel gas. No evidence of   peripancreatic fluid collection. XR CHEST PA LAT   Final Result   IMPRESSION:      No evidence of pneumonia or pulmonary edema. Assessment and Plan: Active Hospital Problems    Diagnosis Date Noted    Acute pancreatitis due to calculus of common bile duct 09/20/2020    Non-intractable vomiting with nausea 09/20/2020    Transaminitis 09/20/2020    Hyperbilirubinemia 09/20/2020       Acute pancreatitis  -US abdomen shows cholelithiasis with dilated CBD up to 1.4cm without evidence of peripancreatic fluid collection. Has elevated liver enzymes and TBili on admission. Lipase ~23556. GI Dr. Chary Brantley was consulted in ED who plans to see pt this AM for ERCP and also recommended pt to be on Levaquin IV. -NPO  -Pain control: Morphine 2mg q3h. Escalate therapy if needed. -Zofran prn  -Will give additional 1L LR bolus.  Start pt on mIVF with LR at 200mL/h  -F/u GI recs      DVT PPx: Heparin SQ  Code Status: Full    Anticipated discharge: 3-4 midnights    Signed By: Celestine Lozano DO     September 20, 2020

## 2020-09-20 NOTE — PROGRESS NOTES
Continuous pulse oximetry in place per MD order. 02 saturation 99% on 2L NC. Pt denies pain and nausea, requesting something to eat. All other needs met at this time. Will continue to monitor. Pt remains NPO per MD order. Pt made aware, verbalizes understanding.

## 2020-09-20 NOTE — ANESTHESIA PREPROCEDURE EVALUATION
Relevant Problems   No relevant active problems       Anesthetic History     PONV          Review of Systems / Medical History  Patient summary reviewed and pertinent labs reviewed    Pulmonary  Within defined limits                 Neuro/Psych   Within defined limits           Cardiovascular  Within defined limits  Hypertension              Exercise tolerance: >4 METS     GI/Hepatic/Renal     GERD           Endo/Other        Morbid obesity     Other Findings   Comments: Persistent N/V two weeks    Cholangeitis - Pancreatitis         Physical Exam    Airway  Mallampati: II  TM Distance: 4 - 6 cm  Neck ROM: normal range of motion   Mouth opening: Normal     Cardiovascular    Rhythm: regular  Rate: normal         Dental  No notable dental hx       Pulmonary  Breath sounds clear to auscultation               Abdominal  GI exam deferred       Other Findings            Anesthetic Plan    ASA: 3, emergent  Anesthesia type: general          Induction: RSI  Anesthetic plan and risks discussed with: Patient

## 2020-09-20 NOTE — PROGRESS NOTES
/109. HR 63. PRN Hydralazine 25 mg given PO per MD order. Safety measures in place, call light within reach. Will continue to monitor.

## 2020-09-20 NOTE — PROCEDURES
ENDOSCOPIC  RETROGRADE CHOLANGIOPANCREATOGRAPHY    DATE of PROCEDURE: 9/20/2020    PT NAME: Riya Fallon     [id number]    MEDICATION: general;    INSTRUMENT:  [id number]    SPECIAL PROCEDURE:papillotomy w/ balloon sweep- 12 mm balloon  BLOOD LOSS- 0 to min. SPEC- no  IMPLANT- none    PROCEDURE: standard procedure w/o complications    ASSESSMENT:  1. 1 cm CBD stone extracted- 12 mm balloon easily passes- dilated cbd and intrahepatics  2. Pancreas not evaluated    3. GB- multiple stones and redundant cystic duct with several stones- unable to access and sweep cystic duct    PLAN:  1. inpt f/u  2.  Surgical consult     Shreya Jorge MD

## 2020-09-20 NOTE — PROGRESS NOTES
Today is day 0 of hospitalization. I saw and evaluated the patient at bedside. Patient was admitted for acute pancreatitis secondary to common bile duct stones. I consulted. Patient had ERCP done this morning, with extraction of 1 cm common bile duct stone. Gallbladder with multiple stones and redundant cystic duct with several stones. General surgery was consulted.

## 2020-09-20 NOTE — PROGRESS NOTES
TRANSFER - IN REPORT:    Verbal report received from RAMON Lorenz (name) on Darshan Sanders  being received from PACU (unit) for routine progression of care      Report consisted of patients Situation, Background, Assessment and   Recommendations(SBAR). Information from the following report(s) SBAR, Procedure Summary, Intake/Output, MAR and Recent Results was reviewed with the receiving nurse. Opportunity for questions and clarification was provided. Assessment completed upon patients arrival to unit and care assumed.

## 2020-09-20 NOTE — ED PROVIDER NOTES
Is a 66-year-old female presenting to the emergency department today complaining of chest pain but she points to the epigastric region. The patient states that she has had nausea and vomiting which is been ongoing for over 3 weeks. The patient was seen and evaluated on 2020 [facility] for nausea and vomiting that time she had a CT abdomen pelvis done which did show stones in the gallbladder. The patient says that she also has a known history of high cholesterol and triglycerides. She is never had a history of pancreatitis previously. The patient says that the pain is severe but intermittent. She has a Zofran she has been taking at home without any relief of her nausea and vomiting. Past Medical History:   Diagnosis Date    Cellulitis     GERD (gastroesophageal reflux disease)     Migraine headache     Obesity     Otitis media     UTI (urinary tract infection)        Past Surgical History:   Procedure Laterality Date    HX GYN          HX HEENT      tonsils-adenoids         No family history on file.     Social History     Socioeconomic History    Marital status: SINGLE     Spouse name: Not on file    Number of children: Not on file    Years of education: Not on file    Highest education level: Not on file   Occupational History    Not on file   Social Needs    Financial resource strain: Not on file    Food insecurity     Worry: Not on file     Inability: Not on file    Transportation needs     Medical: Not on file     Non-medical: Not on file   Tobacco Use    Smoking status: Never Smoker    Smokeless tobacco: Never Used   Substance and Sexual Activity    Alcohol use: No    Drug use: No    Sexual activity: Not on file   Lifestyle    Physical activity     Days per week: Not on file     Minutes per session: Not on file    Stress: Not on file   Relationships    Social connections     Talks on phone: Not on file     Gets together: Not on file     Attends Muslim service: Not on file     Active member of club or organization: Not on file     Attends meetings of clubs or organizations: Not on file     Relationship status: Not on file    Intimate partner violence     Fear of current or ex partner: Not on file     Emotionally abused: Not on file     Physically abused: Not on file     Forced sexual activity: Not on file   Other Topics Concern    Not on file   Social History Narrative    Not on file         ALLERGIES: Patient has no known allergies. Review of Systems   Gastrointestinal: Positive for abdominal pain, nausea and vomiting. All other systems reviewed and are negative. Vitals:    09/20/20 0253 09/20/20 0411 09/20/20 0428 09/20/20 0429   BP: 139/88 (!) 160/90  (!) 169/101   Pulse: (!) 58 64     Resp: 16      Temp: 97.7 °F (36.5 °C)      SpO2: 100% 100% 97% 97%   Weight: 122 kg (269 lb)      Height: 5' 3\" (1.6 m)               Physical Exam     GENERAL:The patient is morbidly obese Body mass index is 47.65 kg/m². , and well-hydrated. Moderate discomfort with active vomiting  VITAL SIGNS: Heart rate, blood pressure, respiratory rate reviewed as recorded in  nurse's notes  EYES: Pupils reactive. Extraocular motion intact. No conjunctival redness or drainage. LUNGS: Breath sounds clear and equal bilaterally no accessory muscle use  CHEST: No deformity  CARDIOVASCULAR: Regular rate and rhythm  ABDOMEN: Soft with epigastric tenderness. No palpable masses or organomegaly. No  peritoneal signs. No rigidity. EXTREMITIES: No clubbing or cyanosis. No joint swelling. Normal muscle tone. No  restricted range of motion appreciated. NEUROLOGIC: Sensation is grossly intact. Cranial nerve exam reveals face is  symmetrical, tongue is midline speech is clear. SKIN: No rash or petechiae. Good skin turgor palpated.         MDM  Number of Diagnoses or Management Options  Diagnosis management comments: Viral infection, gastroenteritis, viral adenitis, pseudomembranous colitis, inflammatory  bowel disease, infectious diarrhea    Abdominal wall pain,     Constipation, fecal impaction, small bowel obstruction, partial small bowel obstruction,  Ileus    UTI, pyelonephritis, renal colic, ureteral stone     Peptic ulcer disease, esophagitis, GERD    Pancreatitis, pancreatic pseudocyst,    hepatic cirrhosis, GI bleed, esophageal varices, poisoning,    gallbladder disease, cholecystitis, diverticulitis, appendicitis, appendicitis with rupture,    ingestion of foreign material         Amount and/or Complexity of Data Reviewed  Clinical lab tests: reviewed and ordered  Tests in the radiology section of CPT®: reviewed and ordered  Tests in the medicine section of CPT®: reviewed and ordered  Decide to obtain previous medical records or to obtain history from someone other than the patient: yes  Independent visualization of images, tracings, or specimens: yes      ED Course as of Sep 20 0544   Sun Sep 20, 2020   0406 Lipase(!): 27,174 [KH]   0530 IMPRESSION:     1. Cholelithiasis, without sonographic evidence of acute cholecystitis.     2. Dilated common bile duct (up to 1.4 cm in diameter). Finding may be may be  secondary to choledocholithiasis, but no obstructive etiology is identified on  this exam.     3. Pancreas not well seen due to overlying bowel gas. No evidence of  peripancreatic fluid collection. US  [facility] [KH]   8311 Discussed with Dr. Sr Members the hospitalist and they are agreeable with the patient be admitted to their service. Janeth Lucio with GI was consulted and he will plan on seeing the patient here in the hospital today and plans on doing an ERCP.   He did request the patient receive Levaquin 500 mg IV.    []      ED Course User Index  [KH] Mary Wells, DO       Procedures

## 2020-09-21 LAB
ALBUMIN SERPL-MCNC: 2.7 G/DL (ref 3.5–5)
ALBUMIN/GLOB SERPL: 0.7 {RATIO} (ref 1.2–3.5)
ALP SERPL-CCNC: 272 U/L (ref 50–136)
ALT SERPL-CCNC: 172 U/L (ref 12–65)
ANION GAP SERPL CALC-SCNC: 6 MMOL/L (ref 7–16)
AST SERPL-CCNC: 50 U/L (ref 15–37)
BASOPHILS # BLD: 0.1 K/UL (ref 0–0.2)
BASOPHILS NFR BLD: 0 % (ref 0–2)
BILIRUB SERPL-MCNC: 1 MG/DL (ref 0.2–1.1)
BUN SERPL-MCNC: 12 MG/DL (ref 6–23)
CALCIUM SERPL-MCNC: 8.9 MG/DL (ref 8.3–10.4)
CHLORIDE SERPL-SCNC: 107 MMOL/L (ref 98–107)
CHOLEST SERPL-MCNC: 189 MG/DL
CO2 SERPL-SCNC: 27 MMOL/L (ref 21–32)
CREAT SERPL-MCNC: 0.74 MG/DL (ref 0.6–1)
DIFFERENTIAL METHOD BLD: ABNORMAL
EOSINOPHIL # BLD: 0 K/UL (ref 0–0.8)
EOSINOPHIL NFR BLD: 0 % (ref 0.5–7.8)
ERYTHROCYTE [DISTWIDTH] IN BLOOD BY AUTOMATED COUNT: 18.2 % (ref 11.9–14.6)
GLOBULIN SER CALC-MCNC: 4 G/DL (ref 2.3–3.5)
GLUCOSE SERPL-MCNC: 95 MG/DL (ref 65–100)
HCT VFR BLD AUTO: 41.3 % (ref 35.8–46.3)
HDLC SERPL-MCNC: 41 MG/DL (ref 40–60)
HDLC SERPL: 4.6 {RATIO}
HGB BLD-MCNC: 13.2 G/DL (ref 11.7–15.4)
IMM GRANULOCYTES # BLD AUTO: 0.2 K/UL (ref 0–0.5)
IMM GRANULOCYTES NFR BLD AUTO: 1 % (ref 0–5)
LDLC SERPL CALC-MCNC: 129.4 MG/DL
LIPID PROFILE,FLP: ABNORMAL
LYMPHOCYTES # BLD: 1.2 K/UL (ref 0.5–4.6)
LYMPHOCYTES NFR BLD: 6 % (ref 13–44)
MCH RBC QN AUTO: 26.2 PG (ref 26.1–32.9)
MCHC RBC AUTO-ENTMCNC: 32 G/DL (ref 31.4–35)
MCV RBC AUTO: 81.9 FL (ref 79.6–97.8)
MONOCYTES # BLD: 1.1 K/UL (ref 0.1–1.3)
MONOCYTES NFR BLD: 6 % (ref 4–12)
NEUTS SEG # BLD: 17.8 K/UL (ref 1.7–8.2)
NEUTS SEG NFR BLD: 88 % (ref 43–78)
NRBC # BLD: 0 K/UL (ref 0–0.2)
PLATELET # BLD AUTO: 432 K/UL (ref 150–450)
PMV BLD AUTO: 11 FL (ref 9.4–12.3)
POTASSIUM SERPL-SCNC: 3.6 MMOL/L (ref 3.5–5.1)
PROT SERPL-MCNC: 6.7 G/DL (ref 6.3–8.2)
RBC # BLD AUTO: 5.04 M/UL (ref 4.05–5.2)
SODIUM SERPL-SCNC: 140 MMOL/L (ref 136–145)
TRIGL SERPL-MCNC: 93 MG/DL (ref 35–150)
VLDLC SERPL CALC-MCNC: 18.6 MG/DL (ref 6–23)
WBC # BLD AUTO: 20.3 K/UL (ref 4.3–11.1)

## 2020-09-21 PROCEDURE — 80053 COMPREHEN METABOLIC PANEL: CPT

## 2020-09-21 PROCEDURE — 74011250637 HC RX REV CODE- 250/637: Performed by: PHYSICIAN ASSISTANT

## 2020-09-21 PROCEDURE — 74011250636 HC RX REV CODE- 250/636: Performed by: STUDENT IN AN ORGANIZED HEALTH CARE EDUCATION/TRAINING PROGRAM

## 2020-09-21 PROCEDURE — 85025 COMPLETE CBC W/AUTO DIFF WBC: CPT

## 2020-09-21 PROCEDURE — 65270000029 HC RM PRIVATE

## 2020-09-21 PROCEDURE — 74011250636 HC RX REV CODE- 250/636: Performed by: FAMILY MEDICINE

## 2020-09-21 PROCEDURE — 2709999900 HC NON-CHARGEABLE SUPPLY

## 2020-09-21 PROCEDURE — 80061 LIPID PANEL: CPT

## 2020-09-21 PROCEDURE — 36415 COLL VENOUS BLD VENIPUNCTURE: CPT

## 2020-09-21 RX ORDER — ENOXAPARIN SODIUM 100 MG/ML
40 INJECTION SUBCUTANEOUS EVERY 24 HOURS
Status: DISCONTINUED | OUTPATIENT
Start: 2020-09-21 | End: 2020-09-21

## 2020-09-21 RX ORDER — FACIAL-BODY WIPES
10 EACH TOPICAL DAILY PRN
Status: DISCONTINUED | OUTPATIENT
Start: 2020-09-22 | End: 2020-09-24 | Stop reason: HOSPADM

## 2020-09-21 RX ORDER — ENOXAPARIN SODIUM 100 MG/ML
40 INJECTION SUBCUTANEOUS EVERY 12 HOURS
Status: DISCONTINUED | OUTPATIENT
Start: 2020-09-21 | End: 2020-09-24 | Stop reason: HOSPADM

## 2020-09-21 RX ORDER — FACIAL-BODY WIPES
10 EACH TOPICAL ONCE
Status: COMPLETED | OUTPATIENT
Start: 2020-09-21 | End: 2020-09-21

## 2020-09-21 RX ORDER — ONDANSETRON 2 MG/ML
4 INJECTION INTRAMUSCULAR; INTRAVENOUS
Status: DISCONTINUED | OUTPATIENT
Start: 2020-09-21 | End: 2020-09-24 | Stop reason: HOSPADM

## 2020-09-21 RX ADMIN — SODIUM CHLORIDE, SODIUM LACTATE, POTASSIUM CHLORIDE, AND CALCIUM CHLORIDE 200 ML/HR: 600; 310; 30; 20 INJECTION, SOLUTION INTRAVENOUS at 00:27

## 2020-09-21 RX ADMIN — ENOXAPARIN SODIUM 40 MG: 40 INJECTION SUBCUTANEOUS at 14:46

## 2020-09-21 RX ADMIN — SODIUM CHLORIDE, SODIUM LACTATE, POTASSIUM CHLORIDE, AND CALCIUM CHLORIDE 200 ML/HR: 600; 310; 30; 20 INJECTION, SOLUTION INTRAVENOUS at 11:04

## 2020-09-21 RX ADMIN — MORPHINE SULFATE 2 MG: 2 INJECTION, SOLUTION INTRAMUSCULAR; INTRAVENOUS at 05:25

## 2020-09-21 RX ADMIN — MORPHINE SULFATE 2 MG: 2 INJECTION, SOLUTION INTRAMUSCULAR; INTRAVENOUS at 22:16

## 2020-09-21 RX ADMIN — MORPHINE SULFATE 2 MG: 2 INJECTION, SOLUTION INTRAMUSCULAR; INTRAVENOUS at 19:26

## 2020-09-21 RX ADMIN — SODIUM CHLORIDE, SODIUM LACTATE, POTASSIUM CHLORIDE, AND CALCIUM CHLORIDE 200 ML/HR: 600; 310; 30; 20 INJECTION, SOLUTION INTRAVENOUS at 16:44

## 2020-09-21 RX ADMIN — SODIUM CHLORIDE, SODIUM LACTATE, POTASSIUM CHLORIDE, AND CALCIUM CHLORIDE 200 ML/HR: 600; 310; 30; 20 INJECTION, SOLUTION INTRAVENOUS at 05:28

## 2020-09-21 RX ADMIN — ONDANSETRON 4 MG: 2 INJECTION INTRAMUSCULAR; INTRAVENOUS at 14:45

## 2020-09-21 RX ADMIN — Medication 5 ML: at 22:13

## 2020-09-21 RX ADMIN — SODIUM CHLORIDE, SODIUM LACTATE, POTASSIUM CHLORIDE, AND CALCIUM CHLORIDE 200 ML/HR: 600; 310; 30; 20 INJECTION, SOLUTION INTRAVENOUS at 22:13

## 2020-09-21 RX ADMIN — BISACODYL 10 MG: 10 SUPPOSITORY RECTAL at 09:31

## 2020-09-21 RX ADMIN — MORPHINE SULFATE 2 MG: 2 INJECTION, SOLUTION INTRAMUSCULAR; INTRAVENOUS at 08:45

## 2020-09-21 RX ADMIN — ONDANSETRON 4 MG: 2 INJECTION INTRAMUSCULAR; INTRAVENOUS at 22:13

## 2020-09-21 RX ADMIN — MORPHINE SULFATE 2 MG: 2 INJECTION, SOLUTION INTRAMUSCULAR; INTRAVENOUS at 14:45

## 2020-09-21 NOTE — PROGRESS NOTES
Pt unable to tolerate clear liquids. Pt complains of pain and nausea, see MAR for interventions. Safety measures in place, call light within reach. Will continue to monitor.

## 2020-09-21 NOTE — PROGRESS NOTES
MSN, CM:  Spoke with patient this AM about discharge planning. Patient lives with her  Letitia Macario" and their 2 children (10 y/o and 9mts) in own mobile home with no steps for entrance. Patient's mother \"Virginia\" and step-dad \"Stew\" live close by. Patient is independent with all ADL's and requires no equipment for ambulation. Patient denies any home oxygen, HH or rehab in the past.  Patient confirms she has no PCP but starts a job Oct. 12th with [employer] and states she will find one when insurance kicks in. Patient educated about free clinic. Patient is able to drive herself. Patient denies any discharge needs at this time. Case Management will continue to monitor for any discharge needs that may arise. Care Management Interventions  PCP Verified by CM: Yes(No PCP)  Mode of Transport at Discharge:  Other (see comment)(family to transport)  Transition of Care Consult (CM Consult): Discharge Planning  Current Support Network: Own Home, Lives with Spouse, Family Lives Nearby  Confirm Follow Up Transport: Self  Freedom of Choice List was Provided with Basic Dialogue that Supports the Patient's Individualized Plan of Care/Goals, Treatment Preferences and Shares the Quality Data Associated with the Providers?: Yes  Discharge Location  Discharge Placement: Home

## 2020-09-21 NOTE — PROGRESS NOTES
[facility] Progress Note         Admit Date:  9/20/2020    Today's Date:  9/21/2020    CC:  Gallstone pancreatitis    Subjective:     Patient s/p ERCP 9/20 with findings as outlined below. She continues to have some epigastric discomfort though says this is significantly improved. She denies N/V. She remains NPO and is awaiting plan for timing of gallbladder surgery. She reports no BM for at least 3wks. She has passed flatus. Medications:   Current Facility-Administered Medications   Medication Dose Route Frequency    sodium chloride (NS) flush 5-40 mL  5-40 mL IntraVENous Q8H    sodium chloride (NS) flush 5-40 mL  5-40 mL IntraVENous PRN    morphine injection 2 mg  2 mg IntraVENous Q3H PRN    heparin (porcine) injection 5,000 Units  5,000 Units SubCUTAneous Q8H    lactated Ringers infusion  200 mL/hr IntraVENous CONTINUOUS    hydrALAZINE (APRESOLINE) tablet 25 mg  25 mg Oral Q6H PRN       Review of Systems:  ROS was obtained, with pertinent positives as listed above. No chest pain or SOB. Diet:  NPO    Objective:   Vitals:  Visit Vitals  /80 (BP 1 Location: Right arm, BP Patient Position: At rest)   Pulse 62   Temp 98.3 °F (36.8 °C)   Resp 16   Ht 5' 3\" (1.6 m)   Wt 122 kg (269 lb)   SpO2 100%   BMI 47.65 kg/m²     Intake/Output:  No intake/output data recorded. 09/19 1901 - 09/21 0700  In: 1000 [I.V.:1000]  Out: 300 [Urine:300]  Exam:  General appearance: alert, cooperative, no distress  Lungs: clear to auscultation bilaterally anteriorly. 2L NC O2  Heart: regular rate and rhythm  Abdomen: soft, +Mild epigastric ttp. No rebound tenderness. Non-distended.   Bowel sounds normal.  Extremities: extremities normal, atraumatic, no cyanosis or edema  Neuro:  alert and oriented    Data Review (Labs):    Recent Labs     09/21/20  0546 09/20/20  0323   WBC 20.3* 13.8*   HGB 13.2 14.3   HCT 41.3 45.6    501*   MCV 81.9 81.6    139   K 3.6 3.5    106   CO2 27 26   BUN 12 7   CREA 0.74 0.88   CA 8.9 9.6   GLU 95 191*   * 341*   * 291*   TBILI 1.0 2.0*   ALB 2.7* 3.6   TP 6.7 8.0   LPSE  --  27,174*     ERCP 9/20/20   SPECIAL PROCEDURE:papillotomy w/ balloon sweep- 12 mm balloon  BLOOD LOSS- 0 to min. SPEC- no  IMPLANT- none  PROCEDURE: standard procedure w/o complications  ASSESSMENT:  1. 1 cm CBD stone extracted- 12 mm balloon easily passes- dilated cbd and intrahepatics  2. Pancreas not evaluated    3. GB- multiple stones and redundant cystic duct with several stones- unable to access and sweep cystic duct  Assessment:     Active Problems:    Acute pancreatitis due to calculus of common bile duct (9/20/2020)      Non-intractable vomiting with nausea (9/20/2020)      Transaminitis (9/20/2020)      Hyperbilirubinemia (9/20/2020)      Choledocholithiasis (9/20/2020)      75-year-old female with PMH hyperlipidemia per pt, GERD, Migraine HAs who presented to [facility] ED 9/20 c/o of chest pain but she pointed to the epigastric region and N/V for over 3wks with ED labs notable for Tbili 2.0, Alk phos 341, , , WBC 13.8. Lipase 27,174. She was seen and evaluated at [address] on 9/2/2020 for N/V and then had a CT abdomen pelvis that showed stones in the gallbladder.  No known prior hx pancreatitis. She is s/p ERCP 9/20 for choledocholithiasis with papillotomy, balloon sweep- 12mm balloon and extraction of 1cm CBD stone and with finding of multiple gallstones and redundant cystic duct with several stones- unable to access and sweep cystic duct. Now LFTs trending downward. WBC did jump up to 20.3. Pt has remained afebrile and VSS. Surgery consult completed and timing of cholecystectomy to be determined. Plan:     -Continue supportive care with IVF hydration, anti-emetics, analgesics p.r.n per primary team.  -May gradually advance diet as tolerated.   Note she is currently NPO- deferring diet advancement to primary team/surgery as surgical plans are pending.  -Follow labs. -Add Dulcolax suppository x1 and then as needed for constipation. Harvie Schaumann, PA-C  [facility]    Patient is seen and examined in collaboration with Dr. Anéglica Alvarado. Assessment and plan as per Dr. Angélica Alvarado. ATTENDING NOTE:  I have seen the patient and agree with the above assessment and plan. Patient clinically improving following ERCP for choledocholithiasis. Will start Dulcolax. Awaiting surgery input regarding timing of cholecystectomy. We will sign off, but do not hesitate to contact us for any additional assistance. We will arrange for a GI follow-up office visit in 3-4 weeks. Patient will be contacted by our office.      Angélica Alvarado MD

## 2020-09-21 NOTE — PROGRESS NOTES
Pt complaining of pain. Rated pain 7 out of 10. Described pain as tightness. Gave pt morphine 2 mg per MD orders.

## 2020-09-21 NOTE — PROGRESS NOTES
Hospitalist Note     Admit Date:  2020  4:01 AM   Name:  Mickie Room   Age:  30 y.o.  :  1995   MRN:  [id number]   PCP:  None  Treatment Team: Attending Provider: Juliana Ball MD; Utilization Review: Mercy Erazo RN; Consulting Provider: Mariana Quezada MD; Care Manager: Isiah Mays RN    HPI/Subjective:   Is seen and examined at bedside this morning. No acute vents overnight. Patient still complaining of epigastric abdominal pain. States nausea and vomiting has resolved. She denies any fevers or chills, lightheadedness, dizziness, chest pain or pressure, shortness of breath or right upper quadrant pain. Patient has not had a bowel movement in some time. She has been n.p.o. while in the hospital.  Seen by surgery today with plan to perform cholecystectomy next week. No other complaints  Objective:     Patient Vitals for the past 24 hrs:   Temp Pulse Resp BP SpO2   20 1059  61 16 136/85 100 %   20 0657 98.3 °F (36.8 °C) 62 16 128/80 100 %   20 0255 98.2 °F (36.8 °C) (!) 57 16 139/86 99 %   20 2319 98.4 °F (36.9 °C) 60 16 130/76 97 %   20 2006 98.6 °F (37 °C) 61 18 (!) 144/85 98 %   20 1950 98.4 °F (36.9 °C) 92 18 119/82 96 %   20 1701    (!) 145/76    20 1514 98.7 °F (37.1 °C) 63 20 (!) 160/109 97 %   20 1255 97.9 °F (36.6 °C) 69 22 135/78 98 %   20 1229  61 16 (!) 144/86 100 %     Oxygen Therapy  O2 Sat (%): 100 % (20 1059)  Pulse via Oximetry: 63 beats per minute (20 1229)  O2 Device: Nasal cannula (20)  O2 Flow Rate (L/min): (2) (20)    Estimated body mass index is 47.65 kg/m² as calculated from the following:    Height as of this encounter: 5' 3\" (1.6 m). Weight as of this encounter: 122 kg (269 lb).       Intake/Output Summary (Last 24 hours) at 2020 1224  Last data filed at 2020 0519  Gross per 24 hour   Intake    Output 300 ml   Net -300 ml       *Note that automatically entered I/Os may not be accurate; dependent on patient compliance with collection and accurate  by techs. General:    Well nourished. Alert. CV:   RRR. No murmur, rub, or gallop. Lungs:   CTAB. No wheezing, rhonchi, or rales. Abdomen:   Soft, significant tenderness in epigastric region. nondistended. Extremities: Warm and dry. No cyanosis or edema. No calf tenderness  Skin:     No rashes or jaundice. Neuro:  No gross focal deficits    Data Reviewed:  I have reviewed all labs, meds, and studies from the last 24 hours:  Recent Results (from the past 24 hour(s))   METABOLIC PANEL, COMPREHENSIVE    Collection Time: 09/21/20  5:46 AM   Result Value Ref Range    Sodium 140 136 - 145 mmol/L    Potassium 3.6 3.5 - 5.1 mmol/L    Chloride 107 98 - 107 mmol/L    CO2 27 21 - 32 mmol/L    Anion gap 6 (L) 7 - 16 mmol/L    Glucose 95 65 - 100 mg/dL    BUN 12 6 - 23 MG/DL    Creatinine 0.74 0.6 - 1.0 MG/DL    GFR est AA >60 >60 ml/min/1.73m2    GFR est non-AA >60 >60 ml/min/1.73m2    Calcium 8.9 8.3 - 10.4 MG/DL    Bilirubin, total 1.0 0.2 - 1.1 MG/DL    ALT (SGPT) 172 (H) 12 - 65 U/L    AST (SGOT) 50 (H) 15 - 37 U/L    Alk.  phosphatase 272 (H) 50 - 136 U/L    Protein, total 6.7 6.3 - 8.2 g/dL    Albumin 2.7 (L) 3.5 - 5.0 g/dL    Globulin 4.0 (H) 2.3 - 3.5 g/dL    A-G Ratio 0.7 (L) 1.2 - 3.5     CBC WITH AUTOMATED DIFF    Collection Time: 09/21/20  5:46 AM   Result Value Ref Range    WBC 20.3 (H) 4.3 - 11.1 K/uL    RBC 5.04 4.05 - 5.2 M/uL    HGB 13.2 11.7 - 15.4 g/dL    HCT 41.3 35.8 - 46.3 %    MCV 81.9 79.6 - 97.8 FL    MCH 26.2 26.1 - 32.9 PG    MCHC 32.0 31.4 - 35.0 g/dL    RDW 18.2 (H) 11.9 - 14.6 %    PLATELET 119 064 - 998 K/uL    MPV 11.0 9.4 - 12.3 FL    ABSOLUTE NRBC 0.00 0.0 - 0.2 K/uL    DF AUTOMATED      NEUTROPHILS 88 (H) 43 - 78 %    LYMPHOCYTES 6 (L) 13 - 44 %    MONOCYTES 6 4.0 - 12.0 %    EOSINOPHILS 0 (L) 0.5 - 7.8 %    BASOPHILS 0 0.0 - 2.0 %    IMMATURE GRANULOCYTES 1 0.0 - 5.0 %    ABS. NEUTROPHILS 17.8 (H) 1.7 - 8.2 K/UL    ABS. LYMPHOCYTES 1.2 0.5 - 4.6 K/UL    ABS. MONOCYTES 1.1 0.1 - 1.3 K/UL    ABS. EOSINOPHILS 0.0 0.0 - 0.8 K/UL    ABS. BASOPHILS 0.1 0.0 - 0.2 K/UL    ABS. IMM. GRANS. 0.2 0.0 - 0.5 K/UL        Current Meds:  Current Facility-Administered Medications   Medication Dose Route Frequency    [START ON 9/22/2020] bisacodyL (DULCOLAX) suppository 10 mg  10 mg Rectal DAILY PRN    sodium chloride (NS) flush 5-40 mL  5-40 mL IntraVENous Q8H    sodium chloride (NS) flush 5-40 mL  5-40 mL IntraVENous PRN    morphine injection 2 mg  2 mg IntraVENous Q3H PRN    heparin (porcine) injection 5,000 Units  5,000 Units SubCUTAneous Q8H    lactated Ringers infusion  200 mL/hr IntraVENous CONTINUOUS    hydrALAZINE (APRESOLINE) tablet 25 mg  25 mg Oral Q6H PRN       Other Studies:  No results found for this visit on 09/20/20. No results found. All Micro Results     None          [unfilled]    Assessment and Plan:     Hospital Problems as of 9/21/2020 Never Reviewed          Codes Class Noted - Resolved POA    Acute pancreatitis due to calculus of common bile duct ICD-10-CM: K85.10  ICD-9-CM: 577.0, 574.50  9/20/2020 - Present Yes        Non-intractable vomiting with nausea ICD-10-CM: R11.2  ICD-9-CM: 787.01  9/20/2020 - Present Unknown        Transaminitis ICD-10-CM: R74.0  ICD-9-CM: 790.4  9/20/2020 - Present Unknown        Hyperbilirubinemia ICD-10-CM: E80.6  ICD-9-CM: 782.4  9/20/2020 - Present Unknown        Choledocholithiasis ICD-10-CM: K80.50  ICD-9-CM: 574.50  9/20/2020 - Present Yes        RESOLVED: Cholangitis ICD-10-CM: K83.09  ICD-9-CM: 576.1  9/20/2020 - 9/20/2020 Yes              Plan:  #Gallstone Pancreatitis with Choledocholithiasis  - US abdomen shows cholelithiasis with dilated CBD up to 1.4cm without evidence of peripancreatic fluid collection.  Lipase 27k on admission  - ERCP performed 9/20 with 1cm CBD stone extracted  - patient NPO, but will start clear liquid diet and increase as tolerated  - cont IVF and pain control  - zofran prn for nausea  - surgery consulted, plan for cholecystectomy 10/2    #Constipation  - still passing gas  - give dulcolax suppository     DC planning/Dispo: Increasing diet as tolerated. Surgery planned for next week so can be discharged once able to tolerate PO without significant pain.       Diet:  DIET CLEAR LIQUID  DVT ppx:  Lovenox    Signed:  Severo Stamp, MD

## 2020-09-21 NOTE — PROGRESS NOTES
Problem: Pancreatitis  Goal: *Control of acute pain  Outcome: Progressing Towards Goal  Goal: *Absence of nausea/vomiting  Outcome: Progressing Towards Goal  Goal: *Optimize nutritional status  Outcome: Progressing Towards Goal  Goal: *Labs within defined limits  Outcome: Progressing Towards Goal     Problem: Patient Education: Go to Patient Education Activity  Goal: Patient/Family Education  Outcome: Progressing Towards Goal

## 2020-09-21 NOTE — PROGRESS NOTES
Shift assessment complete. Pt alert and oriented x4. Respirations even and unlabored. Lung sounds diminished on supplemental 02 via NC. Continuous pulse oximetry in place per MD order. HR regular. Abdomen obese, tender, with active bowel sounds heard in all four quadrants. Pt denies nausea. Skin intact, no edema noted. IV patent and infusing. Bilateral SCDs in place. All needs met at this time. Safety measures in place, call light within reach. Will continue to monitor.

## 2020-09-21 NOTE — PROGRESS NOTES
Pt complains of abdominal pain 7/10. PRN Morphine 2 mg given slow, IVP per MD order. Safety measures in place, call light within reach. Will continue to monitor.

## 2020-09-21 NOTE — PROGRESS NOTES
H&P/Consult Note/Progress Note/Office Note:   Marisol Sherwood  MRN: [id number]  :1995  Age:25 y.o. General Surgery Consult ordered by: Dr. Kacey Drake  Reason for General Surgery Consult: Gallstones    HPI: Marisol Sherwood is a 30 y.o. female with a past medical hx of Obesity, GERD, hyperlipidemia, and gallstones who presented to the ED 20 with c/o epigastric pain along with nausea and vomiting. Pt reports the epigastric pain began the previous day, however, states she has been having nausea and vomiting for the past 3 weeks. WBC 13.8, plts 501. , , Lipase ~66072    Pt was seen in Urgent Care on  for nausea and vomiting and was prescribed antibiotics. She states she did not improve and went to [facility] ED . Gallstones were noted on CT A/P.       2020 Pt reports epigastric pain. AF, HTN, on 2L. S/p ERCP on  with CBD stone extraction. Past Medical History:   Diagnosis Date    Cellulitis     GERD (gastroesophageal reflux disease)     Migraine headache     Obesity     Otitis media     UTI (urinary tract infection)      Past Surgical History:   Procedure Laterality Date    ERCP  2020         HX GYN          HX HEENT      tonsils-adenoids     Current Facility-Administered Medications   Medication Dose Route Frequency    [START ON 2020] bisacodyL (DULCOLAX) suppository 10 mg  10 mg Rectal DAILY PRN    sodium chloride (NS) flush 5-40 mL  5-40 mL IntraVENous Q8H    sodium chloride (NS) flush 5-40 mL  5-40 mL IntraVENous PRN    morphine injection 2 mg  2 mg IntraVENous Q3H PRN    heparin (porcine) injection 5,000 Units  5,000 Units SubCUTAneous Q8H    lactated Ringers infusion  200 mL/hr IntraVENous CONTINUOUS    hydrALAZINE (APRESOLINE) tablet 25 mg  25 mg Oral Q6H PRN     Patient has no known allergies.   Social History     Socioeconomic History    Marital status: SINGLE     Spouse name: Not on file    Number of children: Not on file    Years of education: Not on file    Highest education level: Not on file   Tobacco Use    Smoking status: Never Smoker    Smokeless tobacco: Never Used   Substance and Sexual Activity    Alcohol use: No    Drug use: No     Social History     Tobacco Use   Smoking Status Never Smoker   Smokeless Tobacco Never Used     History reviewed. No pertinent family history. ROS: The patient has no difficulty with chest pain or shortness of breath. No fever or chills. Comprehensive review of systems was otherwise unremarkable except as noted above. Physical Exam:   Visit Vitals  /80 (BP 1 Location: Right arm, BP Patient Position: At rest)   Pulse 62   Temp 98.3 °F (36.8 °C)   Resp 16   Ht 5' 3\" (1.6 m)   Wt 269 lb (122 kg)   SpO2 100%   BMI 47.65 kg/m²     Vitals:    09/20/20 2006 09/20/20 2319 09/21/20 0255 09/21/20 0657   BP: (!) 144/85 130/76 139/86 128/80   Pulse: 61 60 (!) 57 62   Resp: 18 16 16 16   Temp: 98.6 °F (37 °C) 98.4 °F (36.9 °C) 98.2 °F (36.8 °C) 98.3 °F (36.8 °C)   SpO2: 98% 97% 99% 100%   Weight:       Height:         No intake/output data recorded. 09/19 1901 - 09/21 0700  In: 1000 [I.V.:1000]  Out: 300 [Urine:300]    Constitutional: Alert, oriented, cooperative patient in no acute distress; appears stated age    Eyes: Sclera are clear. EOMs intact  ENMT: no external lesions gross hearing normal; no obvious neck masses, no ear or lip lesions, nares normal  CV: RRR. Normal perfusion  Resp: No JVD. Breathing is  non-labored; no audible wheezing. GI: obese, soft and non-distended, tender epigastrium  Musculoskeletal: unremarkable with normal function. No embolic signs or cyanosis.    Neuro:  Oriented; moves all 4; no focal deficits  Psychiatric: normal affect and mood, no memory impairment    Recent vitals (if inpt):  Patient Vitals for the past 24 hrs:   BP Temp Pulse Resp SpO2   09/21/20 0657 128/80 98.3 °F (36.8 °C) 62 16 100 %   09/21/20 0255 139/86 98.2 °F (36.8 °C) (!) 57 16 99 %   09/20/20 5769 130/76 98.4 °F (36.9 °C) 60 16 97 %   09/20/20 2006 (!) 144/85 98.6 °F (37 °C) 61 18 98 %   09/20/20 1950 119/82 98.4 °F (36.9 °C) 92 18 96 %   09/20/20 1701 (!) 145/76       09/20/20 1514 (!) 160/109 98.7 °F (37.1 °C) 63 20 97 %   09/20/20 1255 135/78 97.9 °F (36.6 °C) 69 22 98 %   09/20/20 1229 (!) 144/86  61 16 100 %   09/20/20 1224 (!) 141/84  62 16 100 %   09/20/20 1219 139/84  65 16 100 %   09/20/20 1214 134/85  62 16 99 %   09/20/20 1209 (!) 150/82 98 °F (36.7 °C) 68 16 99 %   09/20/20 1204 (!) 147/83  66 16 99 %   09/20/20 1159 (!) 144/79  60 16 100 %   09/20/20 1154 (!) 140/82  62 16 100 %   09/20/20 1149 (!) 145/83  61 16 100 %   09/20/20 1144 (!) 141/80  62 16 100 %   09/20/20 1139 (!) 147/82  61 16 100 %   09/20/20 1134 (!) 140/83  (!) 58 16 100 %   09/20/20 1129 (!) 141/86  63 16 99 %   09/20/20 1124 (!) 142/72  65 15 100 %   09/20/20 1123 (!) 150/86  73 15 100 %   09/20/20 1117 (!) 149/70 98 °F (36.7 °C) 76 15 98 %   09/20/20 1116 (!) 149/70 98 °F (36.7 °C) 74 15 98 %   09/20/20 1027 (!) 168/112  (!) 116 (!) 2 100 %       Labs:  Recent Labs     09/21/20  0546 09/20/20  0323   WBC 20.3* 13.8*   HGB 13.2 14.3    501*    139   K 3.6 3.5    106   CO2 27 26   BUN 12 7   CREA 0.74 0.88   GLU 95 191*   TBILI 1.0 2.0*   * 291*   * 341*   LPSE  --  27,174*       Lab Results   Component Value Date/Time    WBC 20.3 (H) 09/21/2020 05:46 AM    HGB 13.2 09/21/2020 05:46 AM    PLATELET 785 00/90/0485 05:46 AM    Sodium 140 09/21/2020 05:46 AM    Potassium 3.6 09/21/2020 05:46 AM    Chloride 107 09/21/2020 05:46 AM    CO2 27 09/21/2020 05:46 AM    BUN 12 09/21/2020 05:46 AM    Creatinine 0.74 09/21/2020 05:46 AM    Glucose 95 09/21/2020 05:46 AM    Bilirubin, total 1.0 09/21/2020 05:46 AM    ALT (SGPT) 172 (H) 09/21/2020 05:46 AM    Alk.  phosphatase 272 (H) 09/21/2020 05:46 AM    Lipase 27,174 (H) 09/20/2020 03:23 AM       CT Results  (Last 48 hours)    None chest X-ray      I reviewed recent labs, recent radiologic studies, and pertinent records including other doctor notes if needed. I independently reviewed radiology images for studies I described above or studies I have ordered.    Admission date (for inpatients): 2020   Day of Surgery  Procedure(s):  ENDOSCOPIC RETROGRADE CHOLANGIOPANCREATOGRAPHY (ERCP)  ENDOSCOPIC SPHINCTEROTOMY  ENDOSCOPIC STONE EXTRACTION/BALLOON SWEEP    ASSESSMENT/PLAN:  Problem List  Never Reviewed          Codes Class Noted    Acute pancreatitis due to calculus of common bile duct ICD-10-CM: K85.10  ICD-9-CM: 847.7, 574.50  2020        Non-intractable vomiting with nausea ICD-10-CM: R11.2  ICD-9-CM: 787.01  2020        Transaminitis ICD-10-CM: R74.0  ICD-9-CM: 790.4  2020        Hyperbilirubinemia ICD-10-CM: E80.6  ICD-9-CM: 782.4  2020        Choledocholithiasis ICD-10-CM: K80.50  ICD-9-CM: 574.50  2020        Chronic hypertension ICD-10-CM: I10  ICD-9-CM: 401.9  2019    Overview Signed 2020  6:39 AM by Benjamin Guadalupe DO     Last Assessment & Plan:   31 yo [id number] PPD#1 from  complicated by CHTN/MADDY  - BPs remain normotensive  - s/p 24h mag recovery  - AM labs wnl with exception of leukocytosis (17k) likely secondary to delivery   - stable for discharge  - f/u in clinic on  for BP check             GERD (gastroesophageal reflux disease) ICD-10-CM: K21.9  ICD-9-CM: 530.81  Unknown            Active Problems:    Acute pancreatitis due to calculus of common bile duct (2020)      Non-intractable vomiting with nausea (2020)      Transaminitis (2020)      Hyperbilirubinemia (2020)      Choledocholithiasis (2020)         Plan:  Care Management per Hospitalist  GI Following  --ERCP 20  Elective Robotic cholecystectomy next Friday 10/2 once pancreatitis is resolved  May discharge from a surgical perspective once stable per hospitalist/GI    SARAH Carrillo

## 2020-09-21 NOTE — PROGRESS NOTES
Pt complaining of pressure in her chest. Pt resting quietly in bed with eyes closed, respirations even and unlabored. Pt does not appear to be in any distress. HR 78, 02 100%, /80, RR 16. Continuous pulse oximetry in place. Dr. Mylene Driscoll notified via [address] [place] [address].

## 2020-09-21 NOTE — PROGRESS NOTES
Shift assessment complete. Pt alert and oriented x4. Respirations even and unlabored. Lung sounds clear on 3L of O2 via NC. HR regular. Abdomen soft with active bowel sounds in all 4 quadrants. Skin intact, warm, dry, and appropriate for age. IV patent and infusing. Pt denies pain, nausea, or SOB. Safety measures in place; call light within reach, bed lowered and locked, gripper socks on, and side rails x2 up. Pt needs met at this time. Will continue to monitor.

## 2020-09-22 PROBLEM — R11.2 NON-INTRACTABLE VOMITING WITH NAUSEA: Status: RESOLVED | Noted: 2020-09-20 | Resolved: 2020-09-22

## 2020-09-22 LAB
ANION GAP SERPL CALC-SCNC: 7 MMOL/L (ref 7–16)
BASOPHILS # BLD: 0 K/UL (ref 0–0.2)
BASOPHILS NFR BLD: 0 % (ref 0–2)
BUN SERPL-MCNC: 7 MG/DL (ref 6–23)
CALCIUM SERPL-MCNC: 9 MG/DL (ref 8.3–10.4)
CHLORIDE SERPL-SCNC: 100 MMOL/L (ref 98–107)
CO2 SERPL-SCNC: 29 MMOL/L (ref 21–32)
CREAT SERPL-MCNC: 0.67 MG/DL (ref 0.6–1)
DIFFERENTIAL METHOD BLD: ABNORMAL
EOSINOPHIL # BLD: 0 K/UL (ref 0–0.8)
EOSINOPHIL NFR BLD: 0 % (ref 0.5–7.8)
ERYTHROCYTE [DISTWIDTH] IN BLOOD BY AUTOMATED COUNT: 17.5 % (ref 11.9–14.6)
GLUCOSE SERPL-MCNC: 94 MG/DL (ref 65–100)
HCT VFR BLD AUTO: 40.3 % (ref 35.8–46.3)
HGB BLD-MCNC: 12.9 G/DL (ref 11.7–15.4)
IMM GRANULOCYTES # BLD AUTO: 0.1 K/UL (ref 0–0.5)
IMM GRANULOCYTES NFR BLD AUTO: 1 % (ref 0–5)
LYMPHOCYTES # BLD: 1.3 K/UL (ref 0.5–4.6)
LYMPHOCYTES NFR BLD: 7 % (ref 13–44)
MCH RBC QN AUTO: 26.1 PG (ref 26.1–32.9)
MCHC RBC AUTO-ENTMCNC: 32 G/DL (ref 31.4–35)
MCV RBC AUTO: 81.6 FL (ref 79.6–97.8)
MONOCYTES # BLD: 1.4 K/UL (ref 0.1–1.3)
MONOCYTES NFR BLD: 8 % (ref 4–12)
NEUTS SEG # BLD: 14.3 K/UL (ref 1.7–8.2)
NEUTS SEG NFR BLD: 84 % (ref 43–78)
NRBC # BLD: 0 K/UL (ref 0–0.2)
PLATELET # BLD AUTO: 410 K/UL (ref 150–450)
PMV BLD AUTO: 11.1 FL (ref 9.4–12.3)
POTASSIUM SERPL-SCNC: 3.2 MMOL/L (ref 3.5–5.1)
RBC # BLD AUTO: 4.94 M/UL (ref 4.05–5.2)
SODIUM SERPL-SCNC: 136 MMOL/L (ref 136–145)
WBC # BLD AUTO: 17.1 K/UL (ref 4.3–11.1)

## 2020-09-22 PROCEDURE — 74011250636 HC RX REV CODE- 250/636: Performed by: FAMILY MEDICINE

## 2020-09-22 PROCEDURE — 80048 BASIC METABOLIC PNL TOTAL CA: CPT

## 2020-09-22 PROCEDURE — 85025 COMPLETE CBC W/AUTO DIFF WBC: CPT

## 2020-09-22 PROCEDURE — 2709999900 HC NON-CHARGEABLE SUPPLY

## 2020-09-22 PROCEDURE — 74011250636 HC RX REV CODE- 250/636: Performed by: INTERNAL MEDICINE

## 2020-09-22 PROCEDURE — 74011250636 HC RX REV CODE- 250/636: Performed by: STUDENT IN AN ORGANIZED HEALTH CARE EDUCATION/TRAINING PROGRAM

## 2020-09-22 PROCEDURE — 74011250637 HC RX REV CODE- 250/637: Performed by: INTERNAL MEDICINE

## 2020-09-22 PROCEDURE — 65270000029 HC RM PRIVATE

## 2020-09-22 PROCEDURE — 36415 COLL VENOUS BLD VENIPUNCTURE: CPT

## 2020-09-22 RX ORDER — OXYCODONE HYDROCHLORIDE 5 MG/1
5 TABLET ORAL
Status: DISCONTINUED | OUTPATIENT
Start: 2020-09-22 | End: 2020-09-24 | Stop reason: HOSPADM

## 2020-09-22 RX ORDER — LORAZEPAM 2 MG/ML
0.5 INJECTION INTRAMUSCULAR
Status: COMPLETED | OUTPATIENT
Start: 2020-09-22 | End: 2020-09-22

## 2020-09-22 RX ORDER — MORPHINE SULFATE 2 MG/ML
2 INJECTION, SOLUTION INTRAMUSCULAR; INTRAVENOUS
Status: DISCONTINUED | OUTPATIENT
Start: 2020-09-22 | End: 2020-09-23

## 2020-09-22 RX ORDER — SODIUM CHLORIDE, SODIUM LACTATE, POTASSIUM CHLORIDE, CALCIUM CHLORIDE 600; 310; 30; 20 MG/100ML; MG/100ML; MG/100ML; MG/100ML
150 INJECTION, SOLUTION INTRAVENOUS CONTINUOUS
Status: DISCONTINUED | OUTPATIENT
Start: 2020-09-22 | End: 2020-09-23

## 2020-09-22 RX ORDER — BISACODYL 5 MG
5 TABLET, DELAYED RELEASE (ENTERIC COATED) ORAL DAILY
Status: DISCONTINUED | OUTPATIENT
Start: 2020-09-23 | End: 2020-09-24 | Stop reason: HOSPADM

## 2020-09-22 RX ORDER — OXYCODONE HYDROCHLORIDE 5 MG/1
10 TABLET ORAL
Status: DISCONTINUED | OUTPATIENT
Start: 2020-09-22 | End: 2020-09-24 | Stop reason: HOSPADM

## 2020-09-22 RX ADMIN — Medication 5 ML: at 21:17

## 2020-09-22 RX ADMIN — OXYCODONE 5 MG: 5 TABLET ORAL at 12:49

## 2020-09-22 RX ADMIN — ENOXAPARIN SODIUM 40 MG: 40 INJECTION SUBCUTANEOUS at 06:04

## 2020-09-22 RX ADMIN — Medication 10 ML: at 18:04

## 2020-09-22 RX ADMIN — MORPHINE SULFATE 2 MG: 2 INJECTION, SOLUTION INTRAMUSCULAR; INTRAVENOUS at 08:03

## 2020-09-22 RX ADMIN — ENOXAPARIN SODIUM 40 MG: 40 INJECTION SUBCUTANEOUS at 18:03

## 2020-09-22 RX ADMIN — MORPHINE SULFATE 2 MG: 2 INJECTION, SOLUTION INTRAMUSCULAR; INTRAVENOUS at 01:20

## 2020-09-22 RX ADMIN — Medication 5 ML: at 06:05

## 2020-09-22 RX ADMIN — SODIUM CHLORIDE, SODIUM LACTATE, POTASSIUM CHLORIDE, AND CALCIUM CHLORIDE 150 ML/HR: 600; 310; 30; 20 INJECTION, SOLUTION INTRAVENOUS at 12:58

## 2020-09-22 RX ADMIN — LORAZEPAM 0.5 MG: 2 INJECTION INTRAMUSCULAR; INTRAVENOUS at 08:04

## 2020-09-22 RX ADMIN — MORPHINE SULFATE 2 MG: 2 INJECTION, SOLUTION INTRAMUSCULAR; INTRAVENOUS at 11:03

## 2020-09-22 RX ADMIN — SODIUM CHLORIDE, SODIUM LACTATE, POTASSIUM CHLORIDE, AND CALCIUM CHLORIDE 200 ML/HR: 600; 310; 30; 20 INJECTION, SOLUTION INTRAVENOUS at 03:03

## 2020-09-22 RX ADMIN — SODIUM CHLORIDE, SODIUM LACTATE, POTASSIUM CHLORIDE, AND CALCIUM CHLORIDE 150 ML/HR: 600; 310; 30; 20 INJECTION, SOLUTION INTRAVENOUS at 21:17

## 2020-09-22 RX ADMIN — OXYCODONE 5 MG: 5 TABLET ORAL at 19:22

## 2020-09-22 RX ADMIN — MORPHINE SULFATE 2 MG: 2 INJECTION, SOLUTION INTRAMUSCULAR; INTRAVENOUS at 15:52

## 2020-09-22 RX ADMIN — ONDANSETRON 4 MG: 2 INJECTION INTRAMUSCULAR; INTRAVENOUS at 18:03

## 2020-09-22 RX ADMIN — ONDANSETRON 4 MG: 2 INJECTION INTRAMUSCULAR; INTRAVENOUS at 04:32

## 2020-09-22 RX ADMIN — ONDANSETRON 4 MG: 2 INJECTION INTRAMUSCULAR; INTRAVENOUS at 11:03

## 2020-09-22 RX ADMIN — LORAZEPAM 0.5 MG: 2 INJECTION INTRAMUSCULAR; INTRAVENOUS at 01:42

## 2020-09-22 RX ADMIN — MORPHINE SULFATE 2 MG: 2 INJECTION, SOLUTION INTRAMUSCULAR; INTRAVENOUS at 04:35

## 2020-09-22 RX ADMIN — MORPHINE SULFATE 2 MG: 2 INJECTION, SOLUTION INTRAMUSCULAR; INTRAVENOUS at 21:17

## 2020-09-22 NOTE — ROUTINE PROCESS
Shift assessment complete. Respirations present. Even and unlabored. No s/s of distress. Zero c/o pain at this time. Call light within reach. Encouraged patient to call for assistance. Patient verbalizes understanding. See Doc Flowsheet for assessment details. Patient resting in bed. Bed alarm in progress. Abdomen tender and painful.  02/2l via nasal cannula with continue 02 Sat monitor in progress. SCDs on bilaterally . Saline well with fluids infusing . Will continue to monitor.

## 2020-09-22 NOTE — ROUTINE PROCESS
Patient complaints of chest discomfort. Dr. Jennifer Herrera made aware. Vs stable. Pain and nausea meds just given.

## 2020-09-22 NOTE — PROGRESS NOTES
Hospitalist Note     Admit Date:  2020  4:01 AM   Name:  Yoni Miles   Age:  30 y.o.  :  1995   MRN:  [id number]   PCP:  None  Treatment Team: Attending Provider: Juventino Duran MD; Utilization Review: Kobi Gallagher RN; Consulting Provider: Mariya Taylor MD; Care Manager: Edouard Carmona RN; Utilization Review: Jazmyn Faust RN    HPI/Subjective:   Pt is a 31 y/o F admitted with gallstone pancreatitis. S/p ERCP with stone extraction. Seen by surgery with plan to perform cholecystectomy as outpt     - still with nausea. Some epigastric and chest discomfort radiating to back. No fevers. No SOB    No other complaints  Objective:     Patient Vitals for the past 24 hrs:   Temp Pulse Resp BP SpO2   20 1104 99.1 °F (37.3 °C) 83 22 134/86 99 %   20 0817 98.5 °F (36.9 °C) 71 18 133/88 98 %   20 0437 98 °F (36.7 °C) 79 16 134/79 96 %   20 0051 98.2 °F (36.8 °C) 66 18 136/88 100 %   20 98.3 °F (36.8 °C) 65 17 131/76 100 %   20 1753  78 16 (!) 141/80 100 %   20 1519 99 °F (37.2 °C) 61 18 (!) 140/78 100 %     Oxygen Therapy  O2 Sat (%): 99 % (20 1104)  Pulse via Oximetry: 63 beats per minute (20 1229)  O2 Device: Nasal cannula (20)  O2 Flow Rate (L/min): (2) (20)    Estimated body mass index is 47.65 kg/m² as calculated from the following:    Height as of this encounter: 5' 3\" (1.6 m). Weight as of this encounter: 122 kg (269 lb). Intake/Output Summary (Last 24 hours) at 2020 1204  Last data filed at 2020 1132  Gross per 24 hour   Intake 0 ml   Output 550 ml   Net -550 ml       *Note that automatically entered I/Os may not be accurate; dependent on patient compliance with collection and accurate  by techs. General:    Well nourished. Alert. Abdomen:   Soft, significant tenderness in epigastric region. nondistended. Extremities: Warm and dry. No cyanosis or edema.  No calf tenderness  Skin:     No rashes or jaundice. Neuro:  No gross focal deficits    Data Reviewed:  I have reviewed all labs, meds, and studies from the last 24 hours:  Recent Results (from the past 24 hour(s))   METABOLIC PANEL, BASIC    Collection Time: 09/22/20  5:26 AM   Result Value Ref Range    Sodium 136 136 - 145 mmol/L    Potassium 3.2 (L) 3.5 - 5.1 mmol/L    Chloride 100 98 - 107 mmol/L    CO2 29 21 - 32 mmol/L    Anion gap 7 7 - 16 mmol/L    Glucose 94 65 - 100 mg/dL    BUN 7 6 - 23 MG/DL    Creatinine 0.67 0.6 - 1.0 MG/DL    GFR est AA >60 >60 ml/min/1.73m2    GFR est non-AA >60 >60 ml/min/1.73m2    Calcium 9.0 8.3 - 10.4 MG/DL   CBC WITH AUTOMATED DIFF    Collection Time: 09/22/20  5:26 AM   Result Value Ref Range    WBC 17.1 (H) 4.3 - 11.1 K/uL    RBC 4.94 4.05 - 5.2 M/uL    HGB 12.9 11.7 - 15.4 g/dL    HCT 40.3 35.8 - 46.3 %    MCV 81.6 79.6 - 97.8 FL    MCH 26.1 26.1 - 32.9 PG    MCHC 32.0 31.4 - 35.0 g/dL    RDW 17.5 (H) 11.9 - 14.6 %    PLATELET 122 055 - 058 K/uL    MPV 11.1 9.4 - 12.3 FL    ABSOLUTE NRBC 0.00 0.0 - 0.2 K/uL    DF AUTOMATED      NEUTROPHILS 84 (H) 43 - 78 %    LYMPHOCYTES 7 (L) 13 - 44 %    MONOCYTES 8 4.0 - 12.0 %    EOSINOPHILS 0 (L) 0.5 - 7.8 %    BASOPHILS 0 0.0 - 2.0 %    IMMATURE GRANULOCYTES 1 0.0 - 5.0 %    ABS. NEUTROPHILS 14.3 (H) 1.7 - 8.2 K/UL    ABS. LYMPHOCYTES 1.3 0.5 - 4.6 K/UL    ABS. MONOCYTES 1.4 (H) 0.1 - 1.3 K/UL    ABS. EOSINOPHILS 0.0 0.0 - 0.8 K/UL    ABS. BASOPHILS 0.0 0.0 - 0.2 K/UL    ABS. IMM.  GRANS. 0.1 0.0 - 0.5 K/UL        Current Meds:  Current Facility-Administered Medications   Medication Dose Route Frequency    lactated Ringers infusion  150 mL/hr IntraVENous CONTINUOUS    morphine injection 2 mg  2 mg IntraVENous Q4H PRN    oxyCODONE IR (ROXICODONE) tablet 5 mg  5 mg Oral Q4H PRN    oxyCODONE IR (ROXICODONE) tablet 10 mg  10 mg Oral Q4H PRN    bisacodyL (DULCOLAX) suppository 10 mg  10 mg Rectal DAILY PRN    enoxaparin (LOVENOX) injection 40 mg  40 mg SubCUTAneous Q12H    ondansetron (ZOFRAN) injection 4 mg  4 mg IntraVENous Q6H PRN    sodium chloride (NS) flush 5-40 mL  5-40 mL IntraVENous Q8H    sodium chloride (NS) flush 5-40 mL  5-40 mL IntraVENous PRN    hydrALAZINE (APRESOLINE) tablet 25 mg  25 mg Oral Q6H PRN       Other Studies:  No results found for this visit on 09/20/20. No results found. All Micro Results     None          [unfilled]    Assessment and Plan:     Hospital Problems as of 9/22/2020 Never Reviewed          Codes Class Noted - Resolved POA    * (Principal) Acute pancreatitis due to calculus of common bile duct ICD-10-CM: K85.10  ICD-9-CM: 577.0, 574.50  9/20/2020 - Present Yes        Transaminitis ICD-10-CM: R74.0  ICD-9-CM: 790.4  9/20/2020 - Present Yes        Hyperbilirubinemia ICD-10-CM: E80.6  ICD-9-CM: 782.4  9/20/2020 - Present Yes        Choledocholithiasis ICD-10-CM: K80.50  ICD-9-CM: 574.50  9/20/2020 - Present Yes        RESOLVED: Non-intractable vomiting with nausea ICD-10-CM: R11.2  ICD-9-CM: 787.01  9/20/2020 - 9/22/2020 Yes        RESOLVED: Cholangitis ICD-10-CM: K83.09  ICD-9-CM: 576.1  9/20/2020 - 9/20/2020 Yes              Plan:  #Gallstone Pancreatitis with Choledocholithiasis  -recheck labs in AM.    -diet GI soft as tolerated  - ERCP performed 9/20 with 1cm CBD stone extracted  - patient NPO, but will start clear liquid diet and increase as tolerated  -pain control with PRN narcotics  - cont IVF  - zofran prn   - surgery consulted, plan for cholecystectomy 10/2 as outpatient    #Constipation  - still passing gas  -PRNs    DC planning/Dispo: Increasing diet as tolerated. Surgery planned for next week so can be discharged once able to tolerate PO without significant pain.     Diet:  DIET GI SOFT  DVT ppx:  Lovenox    Signed:  Willow Topete MD

## 2020-09-22 NOTE — PROGRESS NOTES
Problem: Pancreatitis  Goal: *Control of acute pain  Outcome: Progressing Towards Goal  Goal: *Absence of nausea/vomiting  Outcome: Progressing Towards Goal  Goal: *Optimize nutritional status  Outcome: Progressing Towards Goal  Goal: *Labs within defined limits  Outcome: Progressing Towards Goal     Problem: Patient Education: Go to Patient Education Activity  Goal: Patient/Family Education  Outcome: Progressing Towards Goal     Problem: Falls - Risk of  Goal: *Absence of Falls  Description: Document Trung Fall Risk and appropriate interventions in the flowsheet. Outcome: Progressing Towards Goal  Note: Fall Risk Interventions:  Mobility Interventions: Patient to call before getting OOB    Mentation Interventions:  Toileting rounds    Medication Interventions: Patient to call before getting OOB    Elimination Interventions: Call light in reach              Problem: Patient Education: Go to Patient Education Activity  Goal: Patient/Family Education  Outcome: Progressing Towards Goal

## 2020-09-23 LAB
ALBUMIN SERPL-MCNC: 2.4 G/DL (ref 3.5–5)
ALBUMIN/GLOB SERPL: 0.5 {RATIO} (ref 1.2–3.5)
ALP SERPL-CCNC: 187 U/L (ref 50–136)
ALT SERPL-CCNC: 76 U/L (ref 12–65)
AST SERPL-CCNC: 11 U/L (ref 15–37)
BILIRUB DIRECT SERPL-MCNC: 0.3 MG/DL
BILIRUB SERPL-MCNC: 1.1 MG/DL (ref 0.2–1.1)
ERYTHROCYTE [DISTWIDTH] IN BLOOD BY AUTOMATED COUNT: 16.8 % (ref 11.9–14.6)
GLOBULIN SER CALC-MCNC: 4.4 G/DL (ref 2.3–3.5)
HCT VFR BLD AUTO: 39.3 % (ref 35.8–46.3)
HGB BLD-MCNC: 12.3 G/DL (ref 11.7–15.4)
LIPASE SERPL-CCNC: 171 U/L (ref 73–393)
MCH RBC QN AUTO: 25.9 PG (ref 26.1–32.9)
MCHC RBC AUTO-ENTMCNC: 31.3 G/DL (ref 31.4–35)
MCV RBC AUTO: 82.7 FL (ref 79.6–97.8)
NRBC # BLD: 0 K/UL (ref 0–0.2)
PLATELET # BLD AUTO: 368 K/UL (ref 150–450)
PMV BLD AUTO: 11.2 FL (ref 9.4–12.3)
PROT SERPL-MCNC: 6.8 G/DL (ref 6.3–8.2)
RBC # BLD AUTO: 4.75 M/UL (ref 4.05–5.2)
WBC # BLD AUTO: 17.4 K/UL (ref 4.3–11.1)

## 2020-09-23 PROCEDURE — 65270000029 HC RM PRIVATE

## 2020-09-23 PROCEDURE — 80076 HEPATIC FUNCTION PANEL: CPT

## 2020-09-23 PROCEDURE — 2709999900 HC NON-CHARGEABLE SUPPLY

## 2020-09-23 PROCEDURE — 74011250637 HC RX REV CODE- 250/637: Performed by: INTERNAL MEDICINE

## 2020-09-23 PROCEDURE — 85027 COMPLETE CBC AUTOMATED: CPT

## 2020-09-23 PROCEDURE — 74011250636 HC RX REV CODE- 250/636: Performed by: INTERNAL MEDICINE

## 2020-09-23 PROCEDURE — 36415 COLL VENOUS BLD VENIPUNCTURE: CPT

## 2020-09-23 PROCEDURE — 83690 ASSAY OF LIPASE: CPT

## 2020-09-23 PROCEDURE — 74011250636 HC RX REV CODE- 250/636: Performed by: STUDENT IN AN ORGANIZED HEALTH CARE EDUCATION/TRAINING PROGRAM

## 2020-09-23 RX ADMIN — ENOXAPARIN SODIUM 40 MG: 40 INJECTION SUBCUTANEOUS at 17:06

## 2020-09-23 RX ADMIN — ONDANSETRON 4 MG: 2 INJECTION INTRAMUSCULAR; INTRAVENOUS at 17:03

## 2020-09-23 RX ADMIN — MORPHINE SULFATE 2 MG: 2 INJECTION, SOLUTION INTRAMUSCULAR; INTRAVENOUS at 02:09

## 2020-09-23 RX ADMIN — OXYCODONE 5 MG: 5 TABLET ORAL at 04:14

## 2020-09-23 RX ADMIN — OXYCODONE 5 MG: 5 TABLET ORAL at 13:35

## 2020-09-23 RX ADMIN — ONDANSETRON 4 MG: 2 INJECTION INTRAMUSCULAR; INTRAVENOUS at 06:03

## 2020-09-23 RX ADMIN — OXYCODONE 5 MG: 5 TABLET ORAL at 00:11

## 2020-09-23 RX ADMIN — ENOXAPARIN SODIUM 40 MG: 40 INJECTION SUBCUTANEOUS at 05:59

## 2020-09-23 RX ADMIN — MORPHINE SULFATE 2 MG: 2 INJECTION, SOLUTION INTRAMUSCULAR; INTRAVENOUS at 05:59

## 2020-09-23 RX ADMIN — ONDANSETRON 4 MG: 2 INJECTION INTRAMUSCULAR; INTRAVENOUS at 00:11

## 2020-09-23 RX ADMIN — BISACODYL 5 MG: 5 TABLET, COATED ORAL at 08:26

## 2020-09-23 RX ADMIN — OXYCODONE 5 MG: 5 TABLET ORAL at 19:06

## 2020-09-23 RX ADMIN — Medication 10 ML: at 21:01

## 2020-09-23 RX ADMIN — Medication 5 ML: at 05:59

## 2020-09-23 NOTE — PROGRESS NOTES
Problem: Pancreatitis  Goal: *Control of acute pain  Outcome: Progressing Towards Goal  Goal: *Absence of nausea/vomiting  Outcome: Progressing Towards Goal  Goal: *Optimize nutritional status  Outcome: Progressing Towards Goal  Goal: *Labs within defined limits  Outcome: Progressing Towards Goal     Problem: Patient Education: Go to Patient Education Activity  Goal: Patient/Family Education  Outcome: Progressing Towards Goal     Problem: Falls - Risk of  Goal: *Absence of Falls  Description: Document Trung Fall Risk and appropriate interventions in the flowsheet.   Outcome: Progressing Towards Goal  Note: Fall Risk Interventions:  Mobility Interventions: Patient to call before getting OOB    Mentation Interventions: Adequate sleep, hydration, pain control    Medication Interventions: Patient to call before getting OOB    Elimination Interventions: Call light in reach              Problem: Patient Education: Go to Patient Education Activity  Goal: Patient/Family Education  Outcome: Progressing Towards Goal

## 2020-09-23 NOTE — PROGRESS NOTES
Hospitalist Note     Admit Date:  2020  4:01 AM   Name:  Fatemeh Acosta   Age:  30 y.o.  :  1995   MRN:  [id number]   PCP:  None  Treatment Team: Attending Provider: Lynne De MD; Utilization Review: Brennan Prince RN; Consulting Provider: Sasha Myles MD; Care Manager: Melvi Jaquez RN; Utilization Review: Franck Proctor RN    HPI/Subjective:   Pt is a 31 y/o F admitted with gallstone pancreatitis. S/p ERCP with stone extraction. Seen by surgery with plan to perform cholecystectomy as outpt     - pt still has epigastric soreness radiating to back but feeling better. Still some nausea. Trying to eat. Minimal abd tenderness on exam.      No other complaints  Objective:     Patient Vitals for the past 24 hrs:   Temp Pulse Resp BP SpO2   20 0725 99 °F (37.2 °C) (!) 102 16 (!) 123/91 95 %   20 0307 98.4 °F (36.9 °C) 86 16 (!) 133/92 98 %   20 2358 98.4 °F (36.9 °C) 92 17 (!) 144/90 98 %   20 2031 98.7 °F (37.1 °C) 88 18 (!) 145/92 96 %   20 1523 99.4 °F (37.4 °C) 81 20 134/81 97 %   20 1104 99.1 °F (37.3 °C) 83 22 134/86 99 %     Oxygen Therapy  O2 Sat (%): 95 % (20 0725)  Pulse via Oximetry: 63 beats per minute (20 1229)  O2 Device: Nasal cannula (20 07)  O2 Flow Rate (L/min): 2 l/min (20 07)    Estimated body mass index is 47.65 kg/m² as calculated from the following:    Height as of this encounter: 5' 3\" (1.6 m). Weight as of this encounter: 122 kg (269 lb). Intake/Output Summary (Last 24 hours) at 2020 1012  Last data filed at 2020 1847  Gross per 24 hour   Intake 0 ml   Output 150 ml   Net -150 ml       *Note that automatically entered I/Os may not be accurate; dependent on patient compliance with collection and accurate  by techs. General:    Well nourished. Alert. Abdomen:   Soft, mild TTP epigastric. No RUQ ttp  Extremities: Warm and dry. No cyanosis or edema.  No calf tenderness  Skin:     No rashes or jaundice. Neuro:  No gross focal deficits    Data Reviewed:  I have reviewed all labs, meds, and studies from the last 24 hours:  Recent Results (from the past 24 hour(s))   LIPASE    Collection Time: 09/23/20  7:21 AM   Result Value Ref Range    Lipase 171 73 - 393 U/L   HEPATIC FUNCTION PANEL    Collection Time: 09/23/20  7:21 AM   Result Value Ref Range    Protein, total 6.8 6.3 - 8.2 g/dL    Albumin 2.4 (L) 3.5 - 5.0 g/dL    Globulin 4.4 (H) 2.3 - 3.5 g/dL    A-G Ratio 0.5 (L) 1.2 - 3.5      Bilirubin, total 1.1 0.2 - 1.1 MG/DL    Bilirubin, direct 0.3 <0.4 MG/DL    Alk. phosphatase 187 (H) 50 - 136 U/L    AST (SGOT) 11 (L) 15 - 37 U/L    ALT (SGPT) 76 (H) 12 - 65 U/L   CBC W/O DIFF    Collection Time: 09/23/20  7:21 AM   Result Value Ref Range    WBC 17.4 (H) 4.3 - 11.1 K/uL    RBC 4.75 4.05 - 5.2 M/uL    HGB 12.3 11.7 - 15.4 g/dL    HCT 39.3 35.8 - 46.3 %    MCV 82.7 79.6 - 97.8 FL    MCH 25.9 (L) 26.1 - 32.9 PG    MCHC 31.3 (L) 31.4 - 35.0 g/dL    RDW 16.8 (H) 11.9 - 14.6 %    PLATELET 692 882 - 701 K/uL    MPV 11.2 9.4 - 12.3 FL    ABSOLUTE NRBC 0.00 0.0 - 0.2 K/uL        Current Meds:  Current Facility-Administered Medications   Medication Dose Route Frequency    oxyCODONE IR (ROXICODONE) tablet 5 mg  5 mg Oral Q4H PRN    oxyCODONE IR (ROXICODONE) tablet 10 mg  10 mg Oral Q4H PRN    bisacodyL (DULCOLAX) tablet 5 mg  5 mg Oral DAILY    bisacodyL (DULCOLAX) suppository 10 mg  10 mg Rectal DAILY PRN    enoxaparin (LOVENOX) injection 40 mg  40 mg SubCUTAneous Q12H    ondansetron (ZOFRAN) injection 4 mg  4 mg IntraVENous Q6H PRN    sodium chloride (NS) flush 5-40 mL  5-40 mL IntraVENous Q8H    sodium chloride (NS) flush 5-40 mL  5-40 mL IntraVENous PRN    hydrALAZINE (APRESOLINE) tablet 25 mg  25 mg Oral Q6H PRN       Other Studies:  No results found for this visit on 09/20/20. No results found.     All Micro Results     None          [unfilled]    Assessment and Plan:     Hospital Problems as of 9/23/2020 Never Reviewed          Codes Class Noted - Resolved POA    * (Principal) Acute pancreatitis due to calculus of common bile duct ICD-10-CM: K85.10  ICD-9-CM: 577.0, 574.50  9/20/2020 - Present Yes        Transaminitis ICD-10-CM: R74.0  ICD-9-CM: 790.4  9/20/2020 - Present Yes        Hyperbilirubinemia ICD-10-CM: E80.6  ICD-9-CM: 782.4  9/20/2020 - Present Yes        Choledocholithiasis ICD-10-CM: K80.50  ICD-9-CM: 574.50  9/20/2020 - Present Yes        RESOLVED: Non-intractable vomiting with nausea ICD-10-CM: R11.2  ICD-9-CM: 787.01  9/20/2020 - 9/22/2020 Yes        RESOLVED: Cholangitis ICD-10-CM: K83.09  ICD-9-CM: 576.1  9/20/2020 - 9/20/2020 Yes              Plan:  #Gallstone Pancreatitis with Choledocholithiasis  -lipase wnl.   LFTs improved  -diet GI soft as tolerated  - ERCP performed 9/20 with 1cm CBD stone extracted  -stop IV narcotics, may be making nausea worse  - stop IVF and encourage PO  - zofran prn   - surgery consulted, plan for cholecystectomy 10/2 as outpatient    #Constipation  - still passing gas  -PRNs    DC planning/Dispo:    · Home maybe later today if pt feeling well enough    Diet:  DIET GI SOFT  DVT ppx:  Lovenox    Signed:  Mathieu Baig MD

## 2020-09-23 NOTE — PROGRESS NOTES
initial visit, met with patient at bedside, offered support, assurance of spiritual care staff's prayers.     Margaux RODRIGUEZ

## 2020-09-24 VITALS
SYSTOLIC BLOOD PRESSURE: 148 MMHG | DIASTOLIC BLOOD PRESSURE: 90 MMHG | RESPIRATION RATE: 18 BRPM | TEMPERATURE: 99.7 F | WEIGHT: 269 LBS | OXYGEN SATURATION: 94 % | BODY MASS INDEX: 47.66 KG/M2 | HEIGHT: 63 IN | HEART RATE: 97 BPM

## 2020-09-24 PROBLEM — R65.10 SIRS DUE TO NON-INFECTIOUS PROCESS WITHOUT ACUTE ORGAN DYSFUNCTION (HCC): Status: ACTIVE | Noted: 2020-09-24

## 2020-09-24 LAB
BASOPHILS # BLD: 0 K/UL (ref 0–0.2)
BASOPHILS NFR BLD: 0 % (ref 0–2)
DIFFERENTIAL METHOD BLD: ABNORMAL
EOSINOPHIL # BLD: 0 K/UL (ref 0–0.8)
EOSINOPHIL NFR BLD: 0 % (ref 0.5–7.8)
ERYTHROCYTE [DISTWIDTH] IN BLOOD BY AUTOMATED COUNT: 16.3 % (ref 11.9–14.6)
HCT VFR BLD AUTO: 39.1 % (ref 35.8–46.3)
HGB BLD-MCNC: 12.6 G/DL (ref 11.7–15.4)
IMM GRANULOCYTES # BLD AUTO: 0.1 K/UL (ref 0–0.5)
IMM GRANULOCYTES NFR BLD AUTO: 1 % (ref 0–5)
LYMPHOCYTES # BLD: 1.1 K/UL (ref 0.5–4.6)
LYMPHOCYTES NFR BLD: 7 % (ref 13–44)
MCH RBC QN AUTO: 26 PG (ref 26.1–32.9)
MCHC RBC AUTO-ENTMCNC: 32.2 G/DL (ref 31.4–35)
MCV RBC AUTO: 80.8 FL (ref 79.6–97.8)
MONOCYTES # BLD: 1.2 K/UL (ref 0.1–1.3)
MONOCYTES NFR BLD: 7 % (ref 4–12)
NEUTS SEG # BLD: 13.9 K/UL (ref 1.7–8.2)
NEUTS SEG NFR BLD: 85 % (ref 43–78)
NRBC # BLD: 0 K/UL (ref 0–0.2)
PLATELET # BLD AUTO: 409 K/UL (ref 150–450)
PMV BLD AUTO: 10 FL (ref 9.4–12.3)
RBC # BLD AUTO: 4.84 M/UL (ref 4.05–5.2)
WBC # BLD AUTO: 16.4 K/UL (ref 4.3–11.1)

## 2020-09-24 PROCEDURE — 36415 COLL VENOUS BLD VENIPUNCTURE: CPT

## 2020-09-24 PROCEDURE — 74011250636 HC RX REV CODE- 250/636: Performed by: STUDENT IN AN ORGANIZED HEALTH CARE EDUCATION/TRAINING PROGRAM

## 2020-09-24 PROCEDURE — 85025 COMPLETE CBC W/AUTO DIFF WBC: CPT

## 2020-09-24 PROCEDURE — 74011250637 HC RX REV CODE- 250/637: Performed by: INTERNAL MEDICINE

## 2020-09-24 RX ORDER — ONDANSETRON HYDROCHLORIDE 8 MG/1
8 TABLET, FILM COATED ORAL
Qty: 30 TAB | Refills: 0 | Status: ON HOLD | OUTPATIENT
Start: 2020-09-24 | End: 2020-10-02 | Stop reason: SDUPTHER

## 2020-09-24 RX ORDER — TRAMADOL HYDROCHLORIDE 50 MG/1
50 TABLET ORAL
Qty: 12 TAB | Refills: 0 | Status: SHIPPED | OUTPATIENT
Start: 2020-09-24 | End: 2020-09-27

## 2020-09-24 RX ADMIN — ENOXAPARIN SODIUM 40 MG: 40 INJECTION SUBCUTANEOUS at 05:43

## 2020-09-24 RX ADMIN — OXYCODONE 5 MG: 5 TABLET ORAL at 03:17

## 2020-09-24 RX ADMIN — Medication 10 ML: at 05:43

## 2020-09-24 RX ADMIN — BISACODYL 5 MG: 5 TABLET, COATED ORAL at 09:35
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
## 2020-09-24 NOTE — DISCHARGE INSTRUCTIONS
DISCHARGE SUMMARY from Nurse    PATIENT INSTRUCTIONS:    After general anesthesia or intravenous sedation, for 24 hours or while taking prescription Narcotics:  · Limit your activities  · Do not drive and operate hazardous machinery  · Do not make important personal or business decisions  · Do  not drink alcoholic beverages  · If you have not urinated within 8 hours after discharge, please contact your surgeon on call. Report the following to your surgeon:  · Excessive pain, swelling, redness or odor of or around the surgical area  · Temperature over 100.5  · Nausea and vomiting lasting longer than 4 hours or if unable to take medications  · Any signs of decreased circulation or nerve impairment to extremity: change in color, persistent  numbness, tingling, coldness or increase pain  · Any questions    What to do at Home:  Recommended activity: Activity as tolerated. low fat, bland diet at least until surgery    If you experience any of the following symptoms temp > 101.5, unrelieved pain, nausea or vomiting, shortness of breath or fatigue not relieved with rest, please follow up with MD.    *  Please give a list of your current medications to your Primary Care Provider. *  Please update this list whenever your medications are discontinued, doses are      changed, or new medications (including over-the-counter products) are added. *  Please carry medication information at all times in case of emergency situations. These are general instructions for a healthy lifestyle:    No smoking/ No tobacco products/ Avoid exposure to second hand smoke  Surgeon General's Warning:  Quitting smoking now greatly reduces serious risk to your health.     Obesity, smoking, and sedentary lifestyle greatly increases your risk for illness    A healthy diet, regular physical exercise & weight monitoring are important for maintaining a healthy lifestyle    You may be retaining fluid if you have a history of heart failure or if you experience any of the following symptoms:  Weight gain of 3 pounds or more overnight or 5 pounds in a week, increased swelling in our hands or feet or shortness of breath while lying flat in bed. Please call your doctor as soon as you notice any of these symptoms; do not wait until your next office visit. The discharge information has been reviewed with the patient. The patient verbalized understanding. Discharge medications reviewed with the patient and appropriate educational materials and side effects teaching were provided. Patient Education        Endoscopic Retrograde Cholangiopancreatogram (ERCP): What to Expect at [address] [place] [address]  After you have an endoscopic retrograde cholangiopancreatogram (ERCP), you probably will stay at the hospital or clinic for 1 to 2 hours. This will allow the medicine to wear off. You will be able to go home after your doctor or a nurse checks to make sure you are not having any problems. If you stay in the hospital overnight, you may go home the next day. You may have a sore throat for a day or two after the procedure. This care sheet gives you a general idea about how long it will take for you to recover. But each person recovers at a different pace. Follow the steps below to get better as quickly as possible. How can you care for yourself at home? Activity    · Rest as much as you need to after you go home.     · You should be able to go back to your usual activities the day after the procedure. Diet    · Follow your doctor's directions for eating after the procedure.     · Drink plenty of fluids (unless your doctor tells you not to). Medicines    · Your doctor will tell you if and when you can restart your medicines. He or she will also give you instructions about taking any new medicines.     · If you take aspirin or some other blood thinner, ask your doctor if and when to start taking it again.  Make sure that you understand exactly what your doctor wants you to do.     · If you have a sore throat the next day, use an over-the-counter spray to numb your throat. Be safe with medicines. Read and follow all instructions on the label. Follow-up care is a key part of your treatment and safety. Be sure to make and go to all appointments, and call your doctor if you are having problems. It's also a good idea to know your test results and keep a list of the medicines you take. When should you call for help? Call 911 anytime you think you may need emergency care. For example, call if:    · You passed out (lost consciousness).     · Your stools are maroon or very bloody.     · You have trouble breathing. Call your doctor now or go to the emergency room if:    · You have new or worse belly pain.     · You have pain that does not get better after you take pain medicine.     · You have a fever.     · You cannot pass stools or gas.     · You are sick to your stomach or cannot hold down fluids.     · You have blood in your stools. Watch closely for changes in your health, and be sure to contact your doctor if:    · Your throat still hurts after a day or two.     · You do not get better as expected. Where can you learn more? Go to http://prerna-karly.info/  Enter C046115 in the search box to learn more about \"Endoscopic Retrograde Cholangiopancreatogram (ERCP): What to Expect at Home. \"  Current as of: April 15, 2020               Content Version: 12.6  © 4859-1828 [employer]. Care instructions adapted under license by [employer] (which disclaims liability or warranty for this information). If you have questions about a medical condition or this instruction, always ask your healthcare professional. [place] [address] any warranty or liability for your use of this information.          _Patient Education        Pancreatitis: Care Instructions  Your Care Instructions     The pancreas is an organ behind the stomach. It makes hormones and enzymes to help your body digest food. But if these enzymes attack the pancreas, it can get inflamed. This is called pancreatitis. Most cases are caused by gallstones or by heavy alcohol use. If you take care of yourself at home, it will help you get better. It will also help you avoid more problems with your pancreas. Follow-up care is a key part of your treatment and safety. Be sure to make and go to all appointments, and call your doctor if you are having problems. It's also a good idea to know your test results and keep a list of the medicines you take. How can you care for yourself at home? · Drink clear liquids and eat bland foods until you feel better. [place] foods include rice, dry toast, and crackers. They also include bananas and applesauce. · Eat a low-fat diet until your doctor says your pancreas is healed. · Do not drink alcohol. Tell your doctor if you need help to quit. Counseling, support groups, and sometimes medicines can help you stay sober. · Be safe with medicines. Read and follow all instructions on the label. ? If the doctor gave you a prescription medicine for pain, take it as prescribed. ? If you are not taking a prescription pain medicine, ask your doctor if you can take an over-the-counter medicine. · If your doctor prescribed antibiotics, take them as directed. Do not stop taking them just because you feel better. You need to take the full course of antibiotics. · Get extra rest until you feel better. To prevent future problems with your pancreas  · Do not drink alcohol. · Tell your doctors and pharmacist that you've had pancreatitis. They can help you avoid medicines that may cause this problem again. When should you call for help? Call 911 anytime you think you may need emergency care. For example, call if:    · You vomit blood or what looks like coffee grounds.     · Your stools are maroon or very bloody.    Call your doctor now or seek immediate medical care if:    · You have new or worse belly pain.     · Your stools are black and look like tar, or they have streaks of blood.     · You are vomiting. Watch closely for changes in your health, and be sure to contact your doctor if:    · You do not get better as expected. Where can you learn more? Go to http://prerna-karly.info/  Enter J381 in the search box to learn more about \"Pancreatitis: Care Instructions. \"  Current as of: April 15, 2020               Content Version: 12.6  © 7266-3159 [employer]. Care instructions adapted under license by [employer] (which disclaims liability or warranty for this information). If you have questions about a medical condition or this instruction, always ask your healthcare professional. [address] any warranty or liability for your use of this information.        __________________________________________________________________________________________________________________________________

## 2020-09-24 NOTE — PROGRESS NOTES
Paper prescription for ultram given to patient, notified of zofran prescription sent of [facility] on [facility]. IV removed from right AC, no remote telemetry on. Patient instructed to call nurses station when transportation arrives. Patient verbalized understanding.

## 2020-09-24 NOTE — ROUTINE PROCESS
Verbal bedside report received from Emma wright, [facility]. Assumed care of patient.

## 2020-09-24 NOTE — PROGRESS NOTES
Patient on 1L oxygen upon my entrance to room, patient states does not us oxygen at home,  removed oxygen, but left patient on continuous O2 sat monitor to observe oxygen levels while in room. Discharge instructions, follow up information, and medication list provided and explained to the pt. Patient states not on any of the medications listed on prior to admit medication list- states they are old prescriptions and does not currently have any at home. Patient is however requesting a prescription for the zofran and ultram to take home in case of abd pain or nausea at home, message sent to discharging MD.  Opportunity for questions provided. Oxygen levels remained between 94-98% while going over discharge info, will leave on monitor until transportation arrives. Patient states her transportation home will not be here until later this afternoon. Instructed to call once ready to leave.

## 2020-09-24 NOTE — PROGRESS NOTES
MSN, CM:  Patient to be discharged home today with no services ordered or requested. Patient agrees with this discharge plan. Patient has met all milestones for this admission. Family to transport patient home. Care Management Interventions  PCP Verified by CM: Yes(No PCP)  Mode of Transport at Discharge:  Other (see comment)(family to transport)  Transition of Care Consult (CM Consult): Discharge Planning  Current Support Network: Own Home, Lives with Spouse, Family Lives Nearby  Confirm Follow Up Transport: Self  Freedom of Choice List was Provided with Basic Dialogue that Supports the Patient's Individualized Plan of Care/Goals, Treatment Preferences and Shares the Quality Data Associated with the Providers?: Yes  Discharge Location  Discharge Placement: Home

## 2020-09-24 NOTE — PROGRESS NOTES
Problem: Pancreatitis  Goal: *Control of acute pain  Outcome: Progressing Towards Goal  Goal: *Absence of nausea/vomiting  Outcome: Progressing Towards Goal  Goal: *Optimize nutritional status  Outcome: Progressing Towards Goal  Goal: *Labs within defined limits  Outcome: Progressing Towards Goal     Problem: Patient Education: Go to Patient Education Activity  Goal: Patient/Family Education  Outcome: Progressing Towards Goal     Problem: Falls - Risk of  Goal: *Absence of Falls  Description: Document Trung Fall Risk and appropriate interventions in the flowsheet.   Outcome: Progressing Towards Goal  Note: Fall Risk Interventions:  Mobility Interventions: Patient to call before getting OOB    Mentation Interventions: Adequate sleep, hydration, pain control    Medication Interventions: Teach patient to arise slowly    Elimination Interventions: Call light in reach              Problem: Patient Education: Go to Patient Education Activity  Goal: Patient/Family Education  Outcome: Progressing Towards Goal

## 2020-10-01 ENCOUNTER — ANESTHESIA EVENT (OUTPATIENT)
Dept: SURGERY | Age: 25
End: 2020-10-01
Payer: MEDICAID

## 2020-10-01 NOTE — ANESTHESIA PREPROCEDURE EVALUATION
Relevant Problems   No relevant active problems       Anesthetic History   No history of anesthetic complications            Review of Systems / Medical History  Patient summary reviewed and pertinent labs reviewed    Pulmonary  Within defined limits                 Neuro/Psych         Headaches (Migraines)     Cardiovascular  Within defined limits                Exercise tolerance: >4 METS     GI/Hepatic/Renal  Within defined limits              Endo/Other        Morbid obesity     Other Findings            Physical Exam    Airway  Mallampati: II  TM Distance: > 6 cm  Neck ROM: normal range of motion   Mouth opening: Normal     Cardiovascular  Regular rate and rhythm,  S1 and S2 normal,  no murmur, click, rub, or gallop             Dental  No notable dental hx       Pulmonary  Breath sounds clear to auscultation               Abdominal  GI exam deferred       Other Findings            Anesthetic Plan    ASA: 3  Anesthesia type: general          Induction: Intravenous  Anesthetic plan and risks discussed with: Patient and Mother

## 2020-10-02 ENCOUNTER — ANESTHESIA (OUTPATIENT)
Dept: SURGERY | Age: 25
End: 2020-10-02
Payer: MEDICAID

## 2020-10-02 ENCOUNTER — HOSPITAL ENCOUNTER (OUTPATIENT)
Age: 25
Setting detail: OUTPATIENT SURGERY
Discharge: HOME | End: 2020-10-02
Attending: SURGERY | Admitting: SURGERY
Payer: MEDICAID

## 2020-10-02 VITALS
SYSTOLIC BLOOD PRESSURE: 128 MMHG | WEIGHT: 266.4 LBS | HEIGHT: 63 IN | OXYGEN SATURATION: 96 % | BODY MASS INDEX: 47.2 KG/M2 | RESPIRATION RATE: 18 BRPM | HEART RATE: 88 BPM | TEMPERATURE: 97.7 F | DIASTOLIC BLOOD PRESSURE: 69 MMHG

## 2020-10-02 DIAGNOSIS — K80.50 CHOLEDOCHOLITHIASIS: Primary | ICD-10-CM

## 2020-10-02 LAB — HCG UR QL: NEGATIVE

## 2020-10-02 PROCEDURE — 2709999900 HC NON-CHARGEABLE SUPPLY: Performed by: SURGERY

## 2020-10-02 PROCEDURE — 77030029216 HC PRT SGL SITE DAVINCI INTU -C: Performed by: SURGERY

## 2020-10-02 PROCEDURE — 77030035488 HC SEAL UNIV DISP INTU -C: Performed by: SURGERY

## 2020-10-02 PROCEDURE — 74011000254 HC RX REV CODE- 254: Performed by: SURGERY

## 2020-10-02 PROCEDURE — 76210000016 HC OR PH I REC 1 TO 1.5 HR: Performed by: SURGERY

## 2020-10-02 PROCEDURE — 74011250636 HC RX REV CODE- 250/636: Performed by: ANESTHESIOLOGY

## 2020-10-02 PROCEDURE — 77030002933 HC SUT MCRYL J&J -A: Performed by: SURGERY

## 2020-10-02 PROCEDURE — 76060000034 HC ANESTHESIA 1.5 TO 2 HR: Performed by: SURGERY

## 2020-10-02 PROCEDURE — 76010000875 HC OR TIME 1.5 TO 2HR INTENSV - TIER 2: Performed by: SURGERY

## 2020-10-02 PROCEDURE — 77030036733 HC ENDOLP LIG VCRL SUT J&J -C: Performed by: SURGERY

## 2020-10-02 PROCEDURE — 74011000250 HC RX REV CODE- 250: Performed by: REGISTERED NURSE

## 2020-10-02 PROCEDURE — 77030008522 HC TBNG INSUF LAPRO STRY -B: Performed by: SURGERY

## 2020-10-02 PROCEDURE — 88304 TISSUE EXAM BY PATHOLOGIST: CPT

## 2020-10-02 PROCEDURE — 77030002966 HC SUT PDS J&J -A: Performed by: SURGERY

## 2020-10-02 PROCEDURE — 77030039425 HC BLD LARYNG TRULITE DISP TELE -A: Performed by: ANESTHESIOLOGY

## 2020-10-02 PROCEDURE — 74011000250 HC RX REV CODE- 250: Performed by: SURGERY

## 2020-10-02 PROCEDURE — 77030019908 HC STETH ESOPH SIMS -A: Performed by: ANESTHESIOLOGY

## 2020-10-02 PROCEDURE — 77030031139 HC SUT VCRL2 J&J -A: Performed by: SURGERY

## 2020-10-02 PROCEDURE — 77030010507 HC ADH SKN DERMBND J&J -B: Performed by: SURGERY

## 2020-10-02 PROCEDURE — 74011250636 HC RX REV CODE- 250/636: Performed by: SURGERY

## 2020-10-02 PROCEDURE — 77030016151 HC PROTCTR LNS DFOG COVD -B: Performed by: SURGERY

## 2020-10-02 PROCEDURE — 77030010939 HC CLP LIG TELE -B: Performed by: SURGERY

## 2020-10-02 PROCEDURE — 76210000021 HC REC RM PH II 0.5 TO 1 HR: Performed by: SURGERY

## 2020-10-02 PROCEDURE — 77030016154: Performed by: SURGERY

## 2020-10-02 PROCEDURE — 74011250636 HC RX REV CODE- 250/636: Performed by: REGISTERED NURSE

## 2020-10-02 PROCEDURE — 77030040361 HC SLV COMPR DVT MDII -B: Performed by: SURGERY

## 2020-10-02 PROCEDURE — 77030037088 HC TUBE ENDOTRACH ORAL NSL COVD-A: Performed by: ANESTHESIOLOGY

## 2020-10-02 PROCEDURE — 81025 URINE PREGNANCY TEST: CPT

## 2020-10-02 RX ORDER — CEFAZOLIN SODIUM/WATER 2 G/20 ML
2 SYRINGE (ML) INTRAVENOUS ONCE
Status: DISCONTINUED | OUTPATIENT
Start: 2020-10-02 | End: 2020-10-02 | Stop reason: SDUPTHER

## 2020-10-02 RX ORDER — HYDROCODONE BITARTRATE AND ACETAMINOPHEN 5; 325 MG/1; MG/1
2 TABLET ORAL AS NEEDED
Status: DISCONTINUED | OUTPATIENT
Start: 2020-10-02 | End: 2020-10-02 | Stop reason: HOSPADM

## 2020-10-02 RX ORDER — SODIUM CHLORIDE, SODIUM LACTATE, POTASSIUM CHLORIDE, CALCIUM CHLORIDE 600; 310; 30; 20 MG/100ML; MG/100ML; MG/100ML; MG/100ML
75 INJECTION, SOLUTION INTRAVENOUS CONTINUOUS
Status: DISCONTINUED | OUTPATIENT
Start: 2020-10-02 | End: 2020-10-02 | Stop reason: HOSPADM

## 2020-10-02 RX ORDER — FENTANYL CITRATE 50 UG/ML
100 INJECTION, SOLUTION INTRAMUSCULAR; INTRAVENOUS ONCE
Status: DISCONTINUED | OUTPATIENT
Start: 2020-10-02 | End: 2020-10-02 | Stop reason: HOSPADM

## 2020-10-02 RX ORDER — HALOPERIDOL 5 MG/ML
1 INJECTION INTRAMUSCULAR ONCE
Status: COMPLETED | OUTPATIENT
Start: 2020-10-02 | End: 2020-10-02

## 2020-10-02 RX ORDER — GLYCOPYRROLATE 0.2 MG/ML
INJECTION INTRAMUSCULAR; INTRAVENOUS AS NEEDED
Status: DISCONTINUED | OUTPATIENT
Start: 2020-10-02 | End: 2020-10-02 | Stop reason: HOSPADM

## 2020-10-02 RX ORDER — INDOCYANINE GREEN AND WATER 25 MG
5 KIT INJECTION
Status: COMPLETED | OUTPATIENT
Start: 2020-10-02 | End: 2020-10-02

## 2020-10-02 RX ORDER — DEXAMETHASONE SODIUM PHOSPHATE 4 MG/ML
INJECTION, SOLUTION INTRA-ARTICULAR; INTRALESIONAL; INTRAMUSCULAR; INTRAVENOUS; SOFT TISSUE AS NEEDED
Status: DISCONTINUED | OUTPATIENT
Start: 2020-10-02 | End: 2020-10-02 | Stop reason: HOSPADM

## 2020-10-02 RX ORDER — OXYCODONE AND ACETAMINOPHEN 5; 325 MG/1; MG/1
1 TABLET ORAL
Qty: 25 TAB | Refills: 0 | Status: SHIPPED | OUTPATIENT
Start: 2020-10-02 | End: 2020-10-07

## 2020-10-02 RX ORDER — LIDOCAINE HYDROCHLORIDE 10 MG/ML
0.1 INJECTION INFILTRATION; PERINEURAL AS NEEDED
Status: DISCONTINUED | OUTPATIENT
Start: 2020-10-02 | End: 2020-10-02 | Stop reason: HOSPADM

## 2020-10-02 RX ORDER — MIDAZOLAM HYDROCHLORIDE 1 MG/ML
5 INJECTION, SOLUTION INTRAMUSCULAR; INTRAVENOUS ONCE
Status: DISCONTINUED | OUTPATIENT
Start: 2020-10-02 | End: 2020-10-02 | Stop reason: HOSPADM

## 2020-10-02 RX ORDER — MIDAZOLAM HYDROCHLORIDE 1 MG/ML
2 INJECTION, SOLUTION INTRAMUSCULAR; INTRAVENOUS
Status: DISCONTINUED | OUTPATIENT
Start: 2020-10-02 | End: 2020-10-02 | Stop reason: HOSPADM

## 2020-10-02 RX ORDER — NEOSTIGMINE METHYLSULFATE 1 MG/ML
INJECTION, SOLUTION INTRAVENOUS AS NEEDED
Status: DISCONTINUED | OUTPATIENT
Start: 2020-10-02 | End: 2020-10-02 | Stop reason: HOSPADM

## 2020-10-02 RX ORDER — HYDROMORPHONE HYDROCHLORIDE 2 MG/ML
0.5 INJECTION, SOLUTION INTRAMUSCULAR; INTRAVENOUS; SUBCUTANEOUS
Status: DISCONTINUED | OUTPATIENT
Start: 2020-10-02 | End: 2020-10-02 | Stop reason: HOSPADM

## 2020-10-02 RX ORDER — PROPOFOL 10 MG/ML
INJECTION, EMULSION INTRAVENOUS AS NEEDED
Status: DISCONTINUED | OUTPATIENT
Start: 2020-10-02 | End: 2020-10-02 | Stop reason: HOSPADM

## 2020-10-02 RX ORDER — ROCURONIUM BROMIDE 10 MG/ML
INJECTION, SOLUTION INTRAVENOUS AS NEEDED
Status: DISCONTINUED | OUTPATIENT
Start: 2020-10-02 | End: 2020-10-02 | Stop reason: HOSPADM

## 2020-10-02 RX ORDER — ONDANSETRON 2 MG/ML
INJECTION INTRAMUSCULAR; INTRAVENOUS AS NEEDED
Status: DISCONTINUED | OUTPATIENT
Start: 2020-10-02 | End: 2020-10-02 | Stop reason: HOSPADM

## 2020-10-02 RX ORDER — BUPIVACAINE HYDROCHLORIDE AND EPINEPHRINE 2.5; 5 MG/ML; UG/ML
INJECTION, SOLUTION EPIDURAL; INFILTRATION; INTRACAUDAL; PERINEURAL AS NEEDED
Status: DISCONTINUED | OUTPATIENT
Start: 2020-10-02 | End: 2020-10-02 | Stop reason: HOSPADM

## 2020-10-02 RX ORDER — LIDOCAINE HYDROCHLORIDE 20 MG/ML
INJECTION, SOLUTION EPIDURAL; INFILTRATION; INTRACAUDAL; PERINEURAL AS NEEDED
Status: DISCONTINUED | OUTPATIENT
Start: 2020-10-02 | End: 2020-10-02 | Stop reason: HOSPADM

## 2020-10-02 RX ORDER — ONDANSETRON HYDROCHLORIDE 8 MG/1
8 TABLET, FILM COATED ORAL
Qty: 20 TAB | Refills: 0 | Status: SHIPPED | OUTPATIENT
Start: 2020-10-02 | End: 2021-04-11

## 2020-10-02 RX ORDER — OXYCODONE HYDROCHLORIDE 5 MG/1
5 TABLET ORAL
Status: DISCONTINUED | OUTPATIENT
Start: 2020-10-02 | End: 2020-10-02 | Stop reason: HOSPADM

## 2020-10-02 RX ORDER — FENTANYL CITRATE 50 UG/ML
INJECTION, SOLUTION INTRAMUSCULAR; INTRAVENOUS AS NEEDED
Status: DISCONTINUED | OUTPATIENT
Start: 2020-10-02 | End: 2020-10-02 | Stop reason: HOSPADM

## 2020-10-02 RX ORDER — KETOROLAC TROMETHAMINE 30 MG/ML
INJECTION, SOLUTION INTRAMUSCULAR; INTRAVENOUS AS NEEDED
Status: DISCONTINUED | OUTPATIENT
Start: 2020-10-02 | End: 2020-10-02 | Stop reason: HOSPADM

## 2020-10-02 RX ADMIN — GLYCOPYRROLATE 0.4 MG: 0.2 INJECTION, SOLUTION INTRAMUSCULAR; INTRAVENOUS at 09:06

## 2020-10-02 RX ADMIN — PROPOFOL 200 MG: 10 INJECTION, EMULSION INTRAVENOUS at 07:56

## 2020-10-02 RX ADMIN — HYDROMORPHONE HYDROCHLORIDE 0.5 MG: 2 INJECTION INTRAMUSCULAR; INTRAVENOUS; SUBCUTANEOUS at 10:15

## 2020-10-02 RX ADMIN — CEFAZOLIN 3 G: 1 INJECTION, POWDER, FOR SOLUTION INTRAVENOUS at 08:04

## 2020-10-02 RX ADMIN — SODIUM CHLORIDE, SODIUM LACTATE, POTASSIUM CHLORIDE, AND CALCIUM CHLORIDE 75 ML/HR: 600; 310; 30; 20 INJECTION, SOLUTION INTRAVENOUS at 06:29

## 2020-10-02 RX ADMIN — Medication 3 MG: at 09:06

## 2020-10-02 RX ADMIN — FENTANYL CITRATE 25 MCG: 50 INJECTION INTRAMUSCULAR; INTRAVENOUS at 08:59

## 2020-10-02 RX ADMIN — HALOPERIDOL LACTATE 1 MG: 5 INJECTION, SOLUTION INTRAMUSCULAR at 09:52

## 2020-10-02 RX ADMIN — INDOCYANINE GREEN AND WATER 5 MG: KIT at 07:27

## 2020-10-02 RX ADMIN — FENTANYL CITRATE 100 MCG: 50 INJECTION INTRAMUSCULAR; INTRAVENOUS at 07:44

## 2020-10-02 RX ADMIN — FENTANYL CITRATE 25 MCG: 50 INJECTION INTRAMUSCULAR; INTRAVENOUS at 09:19

## 2020-10-02 RX ADMIN — KETOROLAC TROMETHAMINE 30 MG: 30 INJECTION, SOLUTION INTRAMUSCULAR; INTRAVENOUS at 08:59

## 2020-10-02 RX ADMIN — DEXAMETHASONE SODIUM PHOSPHATE 10 MG: 4 INJECTION, SOLUTION INTRAMUSCULAR; INTRAVENOUS at 08:02

## 2020-10-02 RX ADMIN — FENTANYL CITRATE 25 MCG: 50 INJECTION INTRAMUSCULAR; INTRAVENOUS at 09:10

## 2020-10-02 RX ADMIN — HYDROMORPHONE HYDROCHLORIDE 0.5 MG: 2 INJECTION INTRAMUSCULAR; INTRAVENOUS; SUBCUTANEOUS at 09:59

## 2020-10-02 RX ADMIN — FENTANYL CITRATE 25 MCG: 50 INJECTION INTRAMUSCULAR; INTRAVENOUS at 09:04

## 2020-10-02 RX ADMIN — ONDANSETRON 4 MG: 2 INJECTION INTRAMUSCULAR; INTRAVENOUS at 08:02

## 2020-10-02 RX ADMIN — ROCURONIUM BROMIDE 45 MG: 10 INJECTION, SOLUTION INTRAVENOUS at 07:56

## 2020-10-02 RX ADMIN — LIDOCAINE HYDROCHLORIDE 100 MG: 20 INJECTION, SOLUTION EPIDURAL; INFILTRATION; INTRACAUDAL; PERINEURAL at 07:56

## 2020-10-02 NOTE — BRIEF OP NOTE
Brief Postoperative Note    Patient: Bernadette Baker  YOB: 1995  MRN: [id number]    Date of Procedure: 10/2/2020     Pre-Op Diagnosis: Cholecystitis [K81.9]    Post-Op Diagnosis: Same as preoperative diagnosis.       Procedure(s):  CHOLECYSTECTOMY ROBOTIC ASSISTED    Surgeon(s):  Dea Cuellar MD    Surgical Assistant: Physician Assistant: SARAH Guerin    Anesthesia: General     Estimated Blood Loss (mL): Minimal    Complications: None    Specimens:   ID Type Source Tests Collected by Time Destination   1 : gallbladder Preservative Gallbladder  Dea Cuellar MD 10/2/2020 0523 Pathology        Implants: * No implants in log *    Drains: * No LDAs found *    Findings: see operative report    Electronically Signed by SARAH Camarillo on 10/2/2020 at 9:17 AM

## 2020-10-02 NOTE — H&P
HPI: Veto Lora is a 30 y.o. female with a past medical hx of Obesity, GERD, hyperlipidemia, and gallstones who presented to the ED 20 with c/o epigastric pain along with nausea and vomiting. Pt reports the epigastric pain began the previous day, however, states she has been having nausea and vomiting for the past 3 weeks. WBC 13.8, plts 501. , , Lipase ~32069   Pt was seen in Urgent Care on  for nausea and vomiting and was prescribed antibiotics. She states she did not improve and went to [facility] ED . Gallstones were noted on CT A/P. Past Medical History:   Diagnosis Date    Cellulitis     GERD (gastroesophageal reflux disease)     Migraine headache     Obesity     Otitis media     UTI (urinary tract infection)            Past Surgical History:   Procedure Laterality Date    ERCP  2020        HX GYN          HX HEENT      tonsils-adenoids            Current Facility-Administered Medications   Medication Dose Route Frequency    sodium chloride (NS) flush 5-40 mL 5-40 mL IntraVENous Q8H    sodium chloride (NS) flush 5-40 mL 5-40 mL IntraVENous PRN    morphine injection 2 mg 2 mg IntraVENous Q3H PRN    heparin (porcine) injection 5,000 Units 5,000 Units SubCUTAneous Q8H    lactated Ringers infusion 200 mL/hr IntraVENous CONTINUOUS    lactated ringers bolus infusion 1,000 mL 1,000 mL IntraVENous ONCE   Patient has no known allergies. Social History                                                                               Social History         Tobacco Use   Smoking Status Never Smoker   Smokeless Tobacco Never Used   History reviewed. No pertinent family history. ROS: The patient has no difficulty with chest pain or shortness of breath. No fever or chills. Comprehensive review of systems was otherwise unremarkable except as noted above.    Physical Exam:   Visit Vitals   /78 (BP 1 Location: Right arm, BP Patient Position: At rest)   Pulse 69   Temp 97.9 °F (36.6 °C)   Resp 22   Ht 5' 3\" (1.6 m)   Wt 269 lb (122 kg)   SpO2 98%   BMI 47.65 kg/m²            Vitals:    09/20/20 1219 09/20/20 1224 09/20/20 1229 09/20/20 1255   BP: 139/84 (!) 141/84 (!) 144/86 135/78   Pulse: 65 62 61 69   Resp: 16 16 16 22   Temp:    97.9 °F (36.6 °C)   SpO2: 100% 100% 100% 98%   Weight:       Height:       09/20 0701 - 09/20 1900   In: 500 [I.V.:500]   Out: -   09/18 1901 - 09/20 0700   In: 500 [I.V.:500]   Out: -   Constitutional: Alert, oriented, cooperative patient in no acute distress; appears stated age   Eyes: Sclera are clear. EOMs intact   ENMT: no external lesions gross hearing normal; no obvious neck masses, no ear or lip lesions, nares normal   CV: RRR. Normal perfusion   Resp: No JVD. Breathing is non-labored; no audible wheezing. GI: obese, soft and non-distended, non-tender   Musculoskeletal: unremarkable with normal function. No embolic signs or cyanosis.    Neuro: Oriented; moves all 4; no focal deficits   Psychiatric: normal affect and mood, no memory impairment   Recent vitals (if inpt):   Patient Vitals for the past 24 hrs:    BP Temp Pulse Resp SpO2 Height Weight   09/20/20 1255 135/78 97.9 °F (36.6 °C) 69 22 98 % -- --   09/20/20 1229 (!) 144/86 -- 61 16 100 % -- --   09/20/20 1224 (!) 141/84 -- 62 16 100 % -- --   09/20/20 1219 139/84 -- 65 16 100 % -- --   09/20/20 1214 134/85 -- 62 16 99 % -- --   09/20/20 1209 (!) 150/82 98 °F (36.7 °C) 68 16 99 % -- --   09/20/20 1204 (!) 147/83 -- 66 16 99 % -- --   09/20/20 1159 (!) 144/79 -- 60 16 100 % -- --   09/20/20 1154 (!) 140/82 -- 62 16 100 % -- --   09/20/20 1149 (!) 145/83 -- 61 16 100 % -- --   09/20/20 1144 (!) 141/80 -- 62 16 100 % -- --   09/20/20 1139 (!) 147/82 -- 61 16 100 % -- --   09/20/20 1134 (!) 140/83 -- (!) 58 16 100 % -- --   09/20/20 1129 (!) 141/86 -- 63 16 99 % -- --   09/20/20 1124 (!) 142/72 -- 65 15 100 % -- --   09/20/20 1123 (!) 150/86 -- 73 15 100 % -- --   09/20/20 1117 (!) 149/70 98 °F (36.7 °C) 76 15 98 % -- --   09/20/20 1116 (!) 149/70 98 °F (36.7 °C) 74 15 98 % -- --   09/20/20 1027 (!) 168/112 -- (!) 116 (!) 2 100 % -- --   09/20/20 0900 (!) 109/56 -- (!) 102 -- -- -- --   09/20/20 0852 (!) 161/83 97.8 °F (36.6 °C) (!) 54 20 100 % -- --   09/20/20 0728 (!) 149/96 97.9 °F (36.6 °C) (!) 53 20 96 % -- --   09/20/20 0710 (!) 178/83 -- -- -- 100 % -- --   09/20/20 0629 (!) 172/79 -- -- -- 95 % -- --   09/20/20 0622 (!) 173/100 97.7 °F (36.5 °C) -- 16 99 % -- --   09/20/20 0606 (!) 173/100 97.7 °F (36.5 °C) -- 16 99 % -- --   09/20/20 0429 (!) 169/101 -- -- -- 97 % -- --   09/20/20 0428 -- -- -- -- 97 % -- --   09/20/20 0411 (!) 160/90 -- 64 -- 100 % -- --   09/20/20 0253 139/88 97.7 °F (36.5 °C) (!) 58 16 100 % 5' 3\" (1.6 m) 269 lb (122 kg)   Labs:       Recent Labs    09/20/20   0323   WBC 13.8*   HGB 14.3   *      K 3.5      CO2 26   BUN 7   CREA 0.88   *   TBILI 2.0*   *   *   LPSE 27,174*           Lab Results   Component Value Date/Time    WBC 13.8 (H) 09/20/2020 03:23 AM    HGB 14.3 09/20/2020 03:23 AM    PLATELET 025 (H) 20/90/1046 03:23 AM    Sodium 139 09/20/2020 03:23 AM    Potassium 3.5 09/20/2020 03:23 AM    Chloride 106 09/20/2020 03:23 AM    CO2 26 09/20/2020 03:23 AM    BUN 7 09/20/2020 03:23 AM    Creatinine 0.88 09/20/2020 03:23 AM    Glucose 191 (H) 09/20/2020 03:23 AM    Bilirubin, total 2.0 (H) 09/20/2020 03:23 AM    ALT (SGPT) 291 (H) 09/20/2020 03:23 AM    Alk. phosphatase 341 (H) 09/20/2020 03:23 AM    Lipase 27,174 (H) 09/20/2020 03:23 AM         CT Results (Last 48 hours)      None        chest X-ray   I reviewed recent labs, recent radiologic studies, and pertinent records including other doctor notes if needed. I independently reviewed radiology images for studies I described above or studies I have ordered.    Admission date (for inpatients): 9/20/2020   Day of Surgery Procedure(s):   ENDOSCOPIC RETROGRADE CHOLANGIOPANCREATOGRAPHY (ERCP)   ENDOSCOPIC SPHINCTEROTOMY   ENDOSCOPIC STONE EXTRACTION/BALLOON SWEEP   ASSESSMENT/PLAN:              Problem List  Never Reviewed         Codes Class Noted    Acute pancreatitis due to calculus of common bile duct ICD-10-CM: K85.10   ICD-9-CM: 562.6, 574.50  2020        Non-intractable vomiting with nausea ICD-10-CM: R11.2   ICD-9-CM: 787.01  2020        Transaminitis ICD-10-CM: R74.0   ICD-9-CM: 790.4  2020        Hyperbilirubinemia ICD-10-CM: E80.6   ICD-9-CM: 782.4  2020        Choledocholithiasis ICD-10-CM: K80.50   ICD-9-CM: 574.50  2020        Chronic hypertension ICD-10-CM: I10   ICD-9-CM: 401.9  2019    Overview Signed 2020 6:39 AM by Mal Orozco DO     Last Assessment & Plan:   31 yo [id number] PPD#1 from  complicated by CHTN/MADDY   - BPs remain normotensive   - s/p 24h mag recovery   - AM labs wnl with exception of leukocytosis (17k) likely secondary to delivery   - stable for discharge   - f/u in clinic on  for BP check         GERD (gastroesophageal reflux disease) ICD-10-CM: K21.9   ICD-9-CM: 530.81  Unknown            Active Problems:   Acute pancreatitis due to calculus of common bile duct (2020)   Non-intractable vomiting with nausea (2020)   Transaminitis (2020)   Hyperbilirubinemia (2020)   Choledocholithiasis (2020)   Plan:   Proceed with Robotic single site cholecystectomy  Sharron Barrett Jr. M.D.

## 2020-10-02 NOTE — PERIOP NOTES
Ambulated to bathroom for urge to void. Patient feels like she cannot effectively empty her bladder in the stretcher.

## 2020-10-02 NOTE — ANESTHESIA POSTPROCEDURE EVALUATION
Procedure(s):  CHOLECYSTECTOMY ROBOTIC ASSISTED. general    Anesthesia Post Evaluation      Multimodal analgesia: multimodal analgesia used between 6 hours prior to anesthesia start to PACU discharge  Patient location during evaluation: PACU  Patient participation: complete - patient participated  Level of consciousness: awake  Pain management: adequate  Airway patency: patent  Anesthetic complications: no  Cardiovascular status: acceptable  Respiratory status: spontaneous ventilation and acceptable  Hydration status: acceptable  Post anesthesia nausea and vomiting:  controlled      INITIAL Post-op Vital signs:   Vitals Value Taken Time   /82 10/2/2020 11:03 AM   Temp 36.1 °C (97 °F) 10/2/2020  9:25 AM   Pulse 103 10/2/2020 11:05 AM   Resp 15 10/2/2020 10:00 AM   SpO2 96 % 10/2/2020 11:05 AM   Vitals shown include unvalidated device data.

## 2020-10-02 NOTE — OP NOTES
[facility]  OPERATIVE REPORT    Name:  Mary Porter  MR#:  [id number]  :  1995  ACCOUNT #:  [de-identified]  DATE OF SERVICE:  10/02/2020    PREOPERATIVE DIAGNOSES:  1. Cholecystitis, cholelithiasis. 2.  Status post endoscopic retrograde cholangiopancreatography for choledocholithiasis and pancreatitis. 3.  Obesity. POSTOPERATIVE DIAGNOSES:  1. Cholecystitis, cholelithiasis. 2.  Status post endoscopic retrograde cholangiopancreatography for choledocholithiasis and pancreatitis. 3.  Obesity. PROCEDURE PERFORMED:  Robotic single-site cholecystectomy. SURGEON:  Chip Lowe MD    ASSISTANT:  SARAH Zavala    ANESTHESIA:  General.    COMPLICATIONS:  None. COUNTS:  Correct. SPECIMENS REMOVED:  Gallbladder. IMPLANTS:  None. ESTIMATED BLOOD LOSS:  Minimal.    DESCRIPTION OF PROCEDURE:  After the patient was asleep, her abdomen was prepped and draped in sterile fashion. The incision was made just above the umbilicus in the midline. The fascia was then opened and several #0 Vicryl stitches were placed. At this point, the single-site diaphragm was then inserted with a Margarita Leslie. Insufflation was then acquired. Scope was then introduced, and it was seen that the long trocars would be utilized. The robot was then brought in and docked to the camera. The #2 port was then placed under direct visualization. This was then docked. The #1 port was also placed and then docked. The assistance port was then inserted. I then broke scrub and sat at the console. The gallbladder was grasped by the assistant and tented upwards. I began dissection and dissected out the cystic duct completely. Two Hemoclips were placed distally, one proximally, and the cystic duct divided. Bovie was then utilized to divide the tissue, used to elevate and free the gallbladder. The cystic artery was identified. This was doubly hemoclipped and then divided.   Gallbladder was then removed from the gallbladder bed. The area was inspected, and there was no bleeding. At this time, I got up from the console, went and scrubbed and came back. All trocars were removed, and the diaphragm removed with the specimen. The gallbladder was sent to Pathology. Interrupted #0 Vicryl was used to close the fascia, 4-0 subcuticular Monocryl and Dermabond were utilized to close the skin. The patient tolerated the procedure    SARAH Gutierrez was present and scrubbed during the entire procedure. Assistance was needed due to body habitus for exposure of surgical site.       Billy Roper MD      [email or web address]/  D:  10/02/2020 9:43  T:  10/02/2020 12:10  JOB #:  [id number]

## 2020-10-02 NOTE — DISCHARGE INSTRUCTIONS
Discharge Instructions/Follow-up Plans:   MD Instructions:     Follow-up with Juan Carlos Virgen, Physician Assistant for Dr. Atul Oneill in 2 weeks. Keep incisions clean and dry, may remain uncovered. Do not apply lotions, creams or ointments to incisions.     Diet - as tolerated - Soft foods diet. Activity - ambulate - as tolerated - no heavy lifting >10lb. May shower - no tub baths or soaking/submerging.     No driving while taking narcotics. Do not drink alcohol while taking narcotics. Resume other home medications.      If problems or questions arise, please call our office at [phone].     Greater than 30 minutes were spent discharging the patient    After general anesthesia or intravenous sedation, for 24 hours or while taking prescription Narcotics:  · Limit your activities  · A responsible adult needs to be with you for the next 24 hours  · Do not drive and operate hazardous machinery  · Do not make important personal or business decisions  · Do not drink alcoholic beverages  · If you have not urinated within 8 hours after discharge, please contact your surgeon on call. · If you have sleep apnea and have a CPAP machine, please use it for all naps and sleeping. · Please use caution when taking narcotics and any of your home medications that may cause drowsiness. *  Please give a list of your current medications to your Primary Care Provider. *  Please update this list whenever your medications are discontinued, doses are      changed, or new medications (including over-the-counter products) are added. *  Please carry medication information at all times in case of emergency situations. These are general instructions for a healthy lifestyle:  No smoking/ No tobacco products/ Avoid exposure to second hand smoke  Surgeon General's Warning:  Quitting smoking now greatly reduces serious risk to your health.   Obesity, smoking, and sedentary lifestyle greatly increases your risk for illness  A healthy diet, regular physical exercise & weight monitoring are important for maintaining a healthy lifestyle    You may be retaining fluid if you have a history of heart failure or if you experience any of the following symptoms:  Weight gain of 3 pounds or more overnight or 5 pounds in a week, increased swelling in our hands or feet or shortness of breath while lying flat in bed.   Please call your doctor as soon as you notice any of these symptoms; do not wait until your next office visit.

## 2020-10-02 NOTE — PERIOP NOTES
[id number]: Placed on bedpan for urge to defecate. No stool noted. Urine in bedpan upon removal. Patient given wipes to clean herself.

## 2020-10-02 NOTE — PERIOP NOTES
Discharge instructions provided by Corey Cunningham RN and myself to patient and her mother, [place]. Both verbalized understanding. Questions answered, concerns addressed. Unable to e-sign due to electronic pad malfunction.

## 2020-10-13 ENCOUNTER — HOSPITAL ENCOUNTER (OUTPATIENT)
Dept: LAB | Age: 25
Discharge: HOME | End: 2020-10-13
Payer: MEDICAID

## 2020-10-13 DIAGNOSIS — R10.9 ACUTE ABDOMINAL PAIN: ICD-10-CM

## 2020-10-13 LAB
ALBUMIN SERPL-MCNC: 3.3 G/DL (ref 3.5–5)
ALBUMIN/GLOB SERPL: 0.8 {RATIO} (ref 1.2–3.5)
ALP SERPL-CCNC: 106 U/L (ref 50–136)
ALT SERPL-CCNC: 25 U/L (ref 12–65)
ANION GAP SERPL CALC-SCNC: 5 MMOL/L (ref 7–16)
AST SERPL-CCNC: 12 U/L (ref 15–37)
BASOPHILS # BLD: 0 K/UL (ref 0–0.2)
BASOPHILS NFR BLD: 0 % (ref 0–2)
BILIRUB DIRECT SERPL-MCNC: 0.1 MG/DL
BILIRUB SERPL-MCNC: 0.4 MG/DL (ref 0.2–1.1)
BUN SERPL-MCNC: 7 MG/DL (ref 6–23)
CALCIUM SERPL-MCNC: 9.2 MG/DL (ref 8.3–10.4)
CHLORIDE SERPL-SCNC: 107 MMOL/L (ref 98–107)
CO2 SERPL-SCNC: 31 MMOL/L (ref 21–32)
CREAT SERPL-MCNC: 0.83 MG/DL (ref 0.6–1)
DIFFERENTIAL METHOD BLD: ABNORMAL
EOSINOPHIL # BLD: 0.1 K/UL (ref 0–0.8)
EOSINOPHIL NFR BLD: 1 % (ref 0.5–7.8)
ERYTHROCYTE [DISTWIDTH] IN BLOOD BY AUTOMATED COUNT: 15.8 % (ref 11.9–14.6)
GLOBULIN SER CALC-MCNC: 4 G/DL (ref 2.3–3.5)
GLUCOSE SERPL-MCNC: 88 MG/DL (ref 65–100)
HCT VFR BLD AUTO: 42.2 % (ref 35.8–46.3)
HGB BLD-MCNC: 13.2 G/DL (ref 11.7–15.4)
IMM GRANULOCYTES # BLD AUTO: 0.1 K/UL (ref 0–0.5)
IMM GRANULOCYTES NFR BLD AUTO: 1 % (ref 0–5)
LIPASE SERPL-CCNC: 477 U/L (ref 73–393)
LYMPHOCYTES # BLD: 1.7 K/UL (ref 0.5–4.6)
LYMPHOCYTES NFR BLD: 16 % (ref 13–44)
MCH RBC QN AUTO: 25.7 PG (ref 26.1–32.9)
MCHC RBC AUTO-ENTMCNC: 31.3 G/DL (ref 31.4–35)
MCV RBC AUTO: 82.3 FL (ref 79.6–97.8)
MONOCYTES # BLD: 0.7 K/UL (ref 0.1–1.3)
MONOCYTES NFR BLD: 6 % (ref 4–12)
NEUTS SEG # BLD: 8.4 K/UL (ref 1.7–8.2)
NEUTS SEG NFR BLD: 77 % (ref 43–78)
NRBC # BLD: 0 K/UL (ref 0–0.2)
PLATELET # BLD AUTO: 485 K/UL (ref 150–450)
PMV BLD AUTO: 10.3 FL (ref 9.4–12.3)
POTASSIUM SERPL-SCNC: 3.7 MMOL/L (ref 3.5–5.1)
PROT SERPL-MCNC: 7.3 G/DL (ref 6.3–8.2)
RBC # BLD AUTO: 5.13 M/UL (ref 4.05–5.2)
SODIUM SERPL-SCNC: 143 MMOL/L (ref 136–145)
WBC # BLD AUTO: 11 K/UL (ref 4.3–11.1)

## 2020-10-13 PROCEDURE — 80048 BASIC METABOLIC PNL TOTAL CA: CPT

## 2020-10-13 PROCEDURE — 83690 ASSAY OF LIPASE: CPT

## 2020-10-13 PROCEDURE — 85025 COMPLETE CBC W/AUTO DIFF WBC: CPT

## 2020-10-13 PROCEDURE — 36415 COLL VENOUS BLD VENIPUNCTURE: CPT

## 2020-10-13 PROCEDURE — 80076 HEPATIC FUNCTION PANEL: CPT

## 2020-10-15 PROBLEM — E66.01 OBESITY, MORBID (HCC): Status: ACTIVE | Noted: 2020-10-15

## 2020-10-15 PROBLEM — Z90.49 S/P CHOLECYSTECTOMY: Status: ACTIVE | Noted: 2020-10-15

## 2021-04-11 ENCOUNTER — HOSPITAL ENCOUNTER (EMERGENCY)
Age: 26
Discharge: HOME | End: 2021-04-11
Attending: EMERGENCY MEDICINE
Payer: MEDICAID

## 2021-04-11 ENCOUNTER — APPOINTMENT (OUTPATIENT)
Dept: CT IMAGING | Age: 26
End: 2021-04-11
Attending: NURSE PRACTITIONER
Payer: MEDICAID

## 2021-04-11 VITALS
TEMPERATURE: 97.8 F | OXYGEN SATURATION: 100 % | SYSTOLIC BLOOD PRESSURE: 146 MMHG | HEIGHT: 63 IN | RESPIRATION RATE: 18 BRPM | DIASTOLIC BLOOD PRESSURE: 84 MMHG | HEART RATE: 58 BPM | BODY MASS INDEX: 46.07 KG/M2 | WEIGHT: 260 LBS

## 2021-04-11 DIAGNOSIS — N30.00 ACUTE CYSTITIS WITHOUT HEMATURIA: Primary | ICD-10-CM

## 2021-04-11 LAB
ALBUMIN SERPL-MCNC: 4 G/DL (ref 3.5–5)
ALBUMIN/GLOB SERPL: 0.9 {RATIO} (ref 1.2–3.5)
ALP SERPL-CCNC: 106 U/L (ref 50–136)
ALT SERPL-CCNC: 23 U/L (ref 12–65)
ANION GAP SERPL CALC-SCNC: 9 MMOL/L (ref 7–16)
AST SERPL-CCNC: 16 U/L (ref 15–37)
BACTERIA URNS QL MICRO: ABNORMAL /HPF
BASOPHILS # BLD: 0 K/UL (ref 0–0.2)
BASOPHILS NFR BLD: 0 % (ref 0–2)
BILIRUB SERPL-MCNC: 0.8 MG/DL (ref 0.2–1.1)
BUN SERPL-MCNC: 9 MG/DL (ref 6–23)
CALCIUM SERPL-MCNC: 9.7 MG/DL (ref 8.3–10.4)
CASTS URNS QL MICRO: ABNORMAL /LPF
CHLORIDE SERPL-SCNC: 107 MMOL/L (ref 98–107)
CO2 SERPL-SCNC: 23 MMOL/L (ref 21–32)
CREAT SERPL-MCNC: 0.98 MG/DL (ref 0.6–1)
CRYSTALS URNS QL MICRO: 0 /LPF
DIFFERENTIAL METHOD BLD: ABNORMAL
EOSINOPHIL # BLD: 0 K/UL (ref 0–0.8)
EOSINOPHIL NFR BLD: 0 % (ref 0.5–7.8)
EPI CELLS #/AREA URNS HPF: ABNORMAL /HPF
ERYTHROCYTE [DISTWIDTH] IN BLOOD BY AUTOMATED COUNT: 15.2 % (ref 11.9–14.6)
GLOBULIN SER CALC-MCNC: 4.6 G/DL (ref 2.3–3.5)
GLUCOSE SERPL-MCNC: 141 MG/DL (ref 65–100)
HCG UR QL: NEGATIVE
HCT VFR BLD AUTO: 47.9 % (ref 35.8–46.3)
HGB BLD-MCNC: 15.2 G/DL (ref 11.7–15.4)
IMM GRANULOCYTES # BLD AUTO: 0 K/UL (ref 0–0.5)
IMM GRANULOCYTES NFR BLD AUTO: 0 % (ref 0–5)
LIPASE SERPL-CCNC: 43 U/L (ref 73–393)
LYMPHOCYTES # BLD: 0.8 K/UL (ref 0.5–4.6)
LYMPHOCYTES NFR BLD: 6 % (ref 13–44)
MCH RBC QN AUTO: 26.1 PG (ref 26.1–32.9)
MCHC RBC AUTO-ENTMCNC: 31.7 G/DL (ref 31.4–35)
MCV RBC AUTO: 82.2 FL (ref 79.6–97.8)
MONOCYTES # BLD: 0.3 K/UL (ref 0.1–1.3)
MONOCYTES NFR BLD: 2 % (ref 4–12)
MUCOUS THREADS URNS QL MICRO: ABNORMAL /LPF
NEUTS SEG # BLD: 11.5 K/UL (ref 1.7–8.2)
NEUTS SEG NFR BLD: 92 % (ref 43–78)
NRBC # BLD: 0 K/UL (ref 0–0.2)
OTHER OBSERVATIONS,UCOM: ABNORMAL
PLATELET # BLD AUTO: 447 K/UL (ref 150–450)
PMV BLD AUTO: 9.9 FL (ref 9.4–12.3)
POTASSIUM SERPL-SCNC: 3.6 MMOL/L (ref 3.5–5.1)
PROT SERPL-MCNC: 8.6 G/DL (ref 6.3–8.2)
RBC # BLD AUTO: 5.83 M/UL (ref 4.05–5.2)
RBC #/AREA URNS HPF: 0 /HPF
SODIUM SERPL-SCNC: 139 MMOL/L (ref 136–145)
WBC # BLD AUTO: 12.6 K/UL (ref 4.3–11.1)
WBC URNS QL MICRO: ABNORMAL /HPF

## 2021-04-11 PROCEDURE — 80053 COMPREHEN METABOLIC PANEL: CPT

## 2021-04-11 PROCEDURE — 74011250636 HC RX REV CODE- 250/636: Performed by: NURSE PRACTITIONER

## 2021-04-11 PROCEDURE — 81003 URINALYSIS AUTO W/O SCOPE: CPT

## 2021-04-11 PROCEDURE — 99284 EMERGENCY DEPT VISIT MOD MDM: CPT

## 2021-04-11 PROCEDURE — 96374 THER/PROPH/DIAG INJ IV PUSH: CPT

## 2021-04-11 PROCEDURE — 81015 MICROSCOPIC EXAM OF URINE: CPT

## 2021-04-11 PROCEDURE — 96375 TX/PRO/DX INJ NEW DRUG ADDON: CPT

## 2021-04-11 PROCEDURE — 85025 COMPLETE CBC W/AUTO DIFF WBC: CPT

## 2021-04-11 PROCEDURE — 87086 URINE CULTURE/COLONY COUNT: CPT

## 2021-04-11 PROCEDURE — 74177 CT ABD & PELVIS W/CONTRAST: CPT

## 2021-04-11 PROCEDURE — 74011000258 HC RX REV CODE- 258: Performed by: EMERGENCY MEDICINE

## 2021-04-11 PROCEDURE — 81025 URINE PREGNANCY TEST: CPT

## 2021-04-11 PROCEDURE — 74011000636 HC RX REV CODE- 636: Performed by: EMERGENCY MEDICINE

## 2021-04-11 PROCEDURE — 83690 ASSAY OF LIPASE: CPT

## 2021-04-11 RX ORDER — SULFAMETHOXAZOLE AND TRIMETHOPRIM 800; 160 MG/1; MG/1
1 TABLET ORAL 2 TIMES DAILY
Qty: 14 TAB | Refills: 0 | Status: SHIPPED | OUTPATIENT
Start: 2021-04-11 | End: 2021-04-18

## 2021-04-11 RX ORDER — MORPHINE SULFATE 4 MG/ML
4 INJECTION INTRAVENOUS
Status: COMPLETED | OUTPATIENT
Start: 2021-04-11 | End: 2021-04-11

## 2021-04-11 RX ORDER — SODIUM CHLORIDE 0.9 % (FLUSH) 0.9 %
10 SYRINGE (ML) INJECTION
Status: COMPLETED | OUTPATIENT
Start: 2021-04-11 | End: 2021-04-11

## 2021-04-11 RX ORDER — ONDANSETRON 4 MG/1
4 TABLET, ORALLY DISINTEGRATING ORAL
Qty: 6 TAB | Refills: 0 | Status: SHIPPED | OUTPATIENT
Start: 2021-04-11

## 2021-04-11 RX ORDER — ONDANSETRON 2 MG/ML
4 INJECTION INTRAMUSCULAR; INTRAVENOUS
Status: COMPLETED | OUTPATIENT
Start: 2021-04-11 | End: 2021-04-11

## 2021-04-11 RX ADMIN — Medication 10 ML: at 15:21

## 2021-04-11 RX ADMIN — SODIUM CHLORIDE 100 ML: 900 INJECTION, SOLUTION INTRAVENOUS at 15:21

## 2021-04-11 RX ADMIN — ONDANSETRON 4 MG: 2 INJECTION INTRAMUSCULAR; INTRAVENOUS at 14:42

## 2021-04-11 RX ADMIN — MORPHINE SULFATE 4 MG: 4 INJECTION INTRAVENOUS at 15:13

## 2021-04-11 RX ADMIN — IOPAMIDOL 100 ML: 755 INJECTION, SOLUTION INTRAVENOUS at 15:21

## 2021-04-11 RX ADMIN — SODIUM CHLORIDE 1000 ML: 900 INJECTION, SOLUTION INTRAVENOUS at 14:42

## 2021-04-11 NOTE — DISCHARGE INSTRUCTIONS
Medications as prescribed. Make sure you take all of your Bactrim. There should not be any pills left when you finish. Return to the emergency department as needed.

## 2021-04-11 NOTE — ED NOTES
I have reviewed discharge instructions with the patient. The patient verbalized understanding. Patient left ED via Discharge Method: wheelchair to Home with friend  Opportunity for questions and clarification provided. Patient given 2 scripts. To continue your aftercare when you leave the hospital, you may receive an automated call from our care team to check in on how you are doing. This is a free service and part of our promise to provide the best care and service to meet your aftercare needs.  If you have questions, or wish to unsubscribe from this service please call [phone]. Thank you for Choosing our [facility] Emergency Department.

## 2021-04-11 NOTE — ED PROVIDER NOTES
Patient presents with generalized abdominal pain, nausea, vomiting and diarrhea for the past 24 hours. She states she has not been around anyone sick. She denies fever. The history is provided by the patient. Abdominal Pain   This is a new problem. The current episode started 12 to 24 hours ago. The problem occurs constantly. The problem has not changed since onset. The pain is associated with vomiting. The pain is located in the generalized abdominal region. The quality of the pain is cramping. The pain is at a severity of 10/10. The pain is moderate. Associated symptoms include diarrhea, nausea and vomiting. Pertinent negatives include no anorexia, no fever, no belching, no flatus, no hematochezia, no melena, no constipation, no dysuria, no frequency, no hematuria, no headaches, no arthralgias, no myalgias, no trauma, no chest pain, no testicular pain and no back pain. The pain is relieved by nothing. Her past medical history is significant for gallstones. The patient's surgical history includes cholecystectomy.        Past Medical History:   Diagnosis Date    Acute pancreatitis 09/21/2020    hospitalized / cholelithiasis    Cellulitis     GERD (gastroesophageal reflux disease)     only during pregnancy and when taking certain antibotics    Hypertension affecting pregnancy 2019    Migraine headache     Obesity 10/01/2020    BMI 46.94    Otitis media     UTI (urinary tract infection)        Past Surgical History:   Procedure Laterality Date    ERCP  9/20/2020         HX DILATION AND EVACUATION      HX TONSIL AND ADENOIDECTOMY  1990s         Family History:   Problem Relation Age of Onset    Hypertension Mother     No Known Problems Father     No Known Problems Sister     No Known Problems Brother        Social History     Socioeconomic History    Marital status: SINGLE     Spouse name: Not on file    Number of children: Not on file    Years of education: Not on file    Highest education level: Not on file   Occupational History    Not on file   Social Needs    Financial resource strain: Not on file    Food insecurity     Worry: Not on file     Inability: Not on file    Transportation needs     Medical: Not on file     Non-medical: Not on file   Tobacco Use    Smoking status: Never Smoker    Smokeless tobacco: Never Used   Substance and Sexual Activity    Alcohol use: No    Drug use: No    Sexual activity: Not on file   Lifestyle    Physical activity     Days per week: Not on file     Minutes per session: Not on file    Stress: Not on file   Relationships    Social connections     Talks on phone: Not on file     Gets together: Not on file     Attends Advent service: Not on file     Active member of club or organization: Not on file     Attends meetings of clubs or organizations: Not on file     Relationship status: Not on file    Intimate partner violence     Fear of current or ex partner: Not on file     Emotionally abused: Not on file     Physically abused: Not on file     Forced sexual activity: Not on file   Other Topics Concern    Not on file   Social History Narrative    Not on file         ALLERGIES: Patient has no known allergies. Review of Systems   Constitutional: Negative for fever. Cardiovascular: Negative for chest pain. Gastrointestinal: Positive for abdominal pain, diarrhea, nausea and vomiting. Negative for anorexia, constipation, flatus, hematochezia and melena. Genitourinary: Negative for dysuria, frequency, hematuria and testicular pain. Musculoskeletal: Negative for arthralgias, back pain and myalgias. Neurological: Negative for headaches. Vitals:    04/11/21 1328   BP: (!) 146/84   Pulse: (!) 58   Resp: 18   Temp: 97.8 °F (36.6 °C)   SpO2: 100%   Weight: 117.9 kg (260 lb)   Height: 5' 3\" (1.6 m)            Physical Exam  Vitals signs and nursing note reviewed. Constitutional:       Appearance: Normal appearance. She is well-developed. HENT:      Head: Normocephalic and atraumatic. Nose: Nose normal.      Mouth/Throat:      Mouth: Mucous membranes are moist.   Eyes:      Pupils: Pupils are equal, round, and reactive to light. Neck:      Musculoskeletal: Normal range of motion and neck supple. Cardiovascular:      Rate and Rhythm: Normal rate and regular rhythm. Pulses: Normal pulses. Pulmonary:      Effort: Pulmonary effort is normal.      Breath sounds: Normal breath sounds. Abdominal:      General: Bowel sounds are normal. There is no distension. Tenderness: There is abdominal tenderness in the right lower quadrant and suprapubic area. Skin:     General: Skin is warm and dry. Neurological:      General: No focal deficit present. Mental Status: She is alert and oriented to person, place, and time. Psychiatric:         Mood and Affect: Mood normal.         Behavior: Behavior normal.        Ct Abd Pelv W Cont    Result Date: 4/11/2021  CT ABDOMEN AND PELVIS WITH CONTRAST HISTORY: Right lower quadrant abdominal pain COMPARISON: None TECHNIQUE: Helical imaging was performed from the lung bases through the ischial tuberosities during the intravenous infusion of 100 cc of Isovue-370. Oral contrast was not administered. Coronal and sagittal reformats were performed. Dose reduction techniques used: Automated exposure control, adjustment of the mAs and/or kVp according to patient's size, and iterative reconstruction techniques. FINDINGS: *  LUNG BASES: Within normal limits. *  LIVER: Within normal limits. *  GALLBLADDER AND BILE DUCTS: Cholecystectomy. *  SPLEEN: Within normal limits. *  URINARY TRACT: Within normal limits. *  ADRENALS: Within normal limits. *  PANCREAS: Within normal limits. *  GASTROINTESTINAL TRACT: Normal appendix. *  RETROPERITONEUM: Within normal limits. *  PERITONEAL CAVITY AND ABDOMINAL WALL: Within normal limits. *  PELVIS: Within normal limits. *  SPINE / BONES: Within normal limits.  *  OTHER COMMENTS: None. No acute abnormalities. Normal appendix. Date of Dictation: 4/11/2021 3:36 PM   Recent Results (from the past 12 hour(s))   CBC WITH AUTOMATED DIFF    Collection Time: 04/11/21  1:35 PM   Result Value Ref Range    WBC 12.6 (H) 4.3 - 11.1 K/uL    RBC 5.83 (H) 4.05 - 5.2 M/uL    HGB 15.2 11.7 - 15.4 g/dL    HCT 47.9 (H) 35.8 - 46.3 %    MCV 82.2 79.6 - 97.8 FL    MCH 26.1 26.1 - 32.9 PG    MCHC 31.7 31.4 - 35.0 g/dL    RDW 15.2 (H) 11.9 - 14.6 %    PLATELET 878 214 - 488 K/uL    MPV 9.9 9.4 - 12.3 FL    ABSOLUTE NRBC 0.00 0.0 - 0.2 K/uL    NEUTROPHILS 92 (H) 43 - 78 %    LYMPHOCYTES 6 (L) 13 - 44 %    MONOCYTES 2 (L) 4.0 - 12.0 %    EOSINOPHILS 0 (L) 0.5 - 7.8 %    BASOPHILS 0 0.0 - 2.0 %    IMMATURE GRANULOCYTES 0 0.0 - 5.0 %    ABS. NEUTROPHILS 11.5 (H) 1.7 - 8.2 K/UL    ABS. LYMPHOCYTES 0.8 0.5 - 4.6 K/UL    ABS. MONOCYTES 0.3 0.1 - 1.3 K/UL    ABS. EOSINOPHILS 0.0 0.0 - 0.8 K/UL    ABS. BASOPHILS 0.0 0.0 - 0.2 K/UL    ABS. IMM. GRANS. 0.0 0.0 - 0.5 K/UL    DF AUTOMATED     METABOLIC PANEL, COMPREHENSIVE    Collection Time: 04/11/21  1:35 PM   Result Value Ref Range    Sodium 139 136 - 145 mmol/L    Potassium 3.6 3.5 - 5.1 mmol/L    Chloride 107 98 - 107 mmol/L    CO2 23 21 - 32 mmol/L    Anion gap 9 7 - 16 mmol/L    Glucose 141 (H) 65 - 100 mg/dL    BUN 9 6 - 23 MG/DL    Creatinine 0.98 0.6 - 1.0 MG/DL    GFR est AA >60 >60 ml/min/1.73m2    GFR est non-AA >60 >60 ml/min/1.73m2    Calcium 9.7 8.3 - 10.4 MG/DL    Bilirubin, total 0.8 0.2 - 1.1 MG/DL    ALT (SGPT) 23 12 - 65 U/L    AST (SGOT) 16 15 - 37 U/L    Alk.  phosphatase 106 50 - 136 U/L    Protein, total 8.6 (H) 6.3 - 8.2 g/dL    Albumin 4.0 3.5 - 5.0 g/dL    Globulin 4.6 (H) 2.3 - 3.5 g/dL    A-G Ratio 0.9 (L) 1.2 - 3.5     LIPASE    Collection Time: 04/11/21  1:35 PM   Result Value Ref Range    Lipase 43 (L) 73 - 393 U/L   HCG URINE, QL. - POC    Collection Time: 04/11/21  2:39 PM   Result Value Ref Range    Pregnancy test,urine (POC) Negative NEG     URINE MICROSCOPIC    Collection Time: 04/11/21  2:48 PM   Result Value Ref Range    WBC 10-20 0 /hpf    RBC 0 0 /hpf    Epithelial cells 5-10 0 /hpf    Bacteria 3+ (H) 0 /hpf    Casts 0-3 0 /lpf    Crystals, urine 0 0 /LPF    Mucus 2+ (H) 0 /lpf    Other observations RESULTS VERIFIED MANUALLY         MDM  Number of Diagnoses or Management Options  Acute cystitis without hematuria  Diagnosis management comments: UA noted to be infected. Prescription for bactrim. Negative for appendicitis on CT scan. Lipase negative for pancreatis. Symptoms improved with treatment.         Amount and/or Complexity of Data Reviewed  Clinical lab tests: reviewed and ordered  Tests in the radiology section of CPT®: ordered and reviewed  Tests in the medicine section of CPT®: ordered and reviewed    Patient Progress  Patient progress: stable         Procedures

## 2021-04-11 NOTE — ED TRIAGE NOTES
Patient complaints of nausea, vomiting, diarrhea and lower abdominal discomfort since last night without any known fever or urinary symptoms.  Patient denies any chance of pregnancy and states history of pancreatitis that felt similar in the past.

## 2021-04-13 ENCOUNTER — APPOINTMENT (OUTPATIENT)
Dept: CT IMAGING | Age: 26
End: 2021-04-13
Attending: EMERGENCY MEDICINE
Payer: MEDICAID

## 2021-04-13 ENCOUNTER — HOSPITAL ENCOUNTER (EMERGENCY)
Age: 26
Discharge: HOME | End: 2021-04-13
Attending: EMERGENCY MEDICINE
Payer: MEDICAID

## 2021-04-13 VITALS
DIASTOLIC BLOOD PRESSURE: 90 MMHG | SYSTOLIC BLOOD PRESSURE: 165 MMHG | TEMPERATURE: 98.2 F | HEIGHT: 63 IN | WEIGHT: 260 LBS | OXYGEN SATURATION: 100 % | RESPIRATION RATE: 30 BRPM | HEART RATE: 60 BPM | BODY MASS INDEX: 46.07 KG/M2

## 2021-04-13 DIAGNOSIS — R10.13 ABDOMINAL PAIN, EPIGASTRIC: ICD-10-CM

## 2021-04-13 DIAGNOSIS — D72.829 LEUKOCYTOSIS, UNSPECIFIED TYPE: ICD-10-CM

## 2021-04-13 DIAGNOSIS — R11.2 INTRACTABLE VOMITING WITH NAUSEA, UNSPECIFIED VOMITING TYPE: Primary | ICD-10-CM

## 2021-04-13 LAB
ALBUMIN SERPL-MCNC: 4 G/DL (ref 3.5–5)
ALBUMIN/GLOB SERPL: 0.8 {RATIO} (ref 1.2–3.5)
ALP SERPL-CCNC: 97 U/L (ref 50–136)
ALT SERPL-CCNC: 22 U/L (ref 12–65)
ANION GAP SERPL CALC-SCNC: 13 MMOL/L (ref 7–16)
AST SERPL-CCNC: 26 U/L (ref 15–37)
BACTERIA SPEC CULT: NORMAL
BACTERIA SPEC CULT: NORMAL
BASOPHILS # BLD: 0 K/UL (ref 0–0.2)
BASOPHILS NFR BLD: 0 % (ref 0–2)
BILIRUB SERPL-MCNC: 0.7 MG/DL (ref 0.2–1.1)
BUN SERPL-MCNC: 13 MG/DL (ref 6–23)
CALCIUM SERPL-MCNC: 9.8 MG/DL (ref 8.3–10.4)
CHLORIDE SERPL-SCNC: 103 MMOL/L (ref 98–107)
CO2 SERPL-SCNC: 25 MMOL/L (ref 21–32)
CREAT SERPL-MCNC: 1.17 MG/DL (ref 0.6–1)
DIFFERENTIAL METHOD BLD: ABNORMAL
EOSINOPHIL # BLD: 0 K/UL (ref 0–0.8)
EOSINOPHIL NFR BLD: 0 % (ref 0.5–7.8)
ERYTHROCYTE [DISTWIDTH] IN BLOOD BY AUTOMATED COUNT: 15 % (ref 11.9–14.6)
GLOBULIN SER CALC-MCNC: 4.9 G/DL (ref 2.3–3.5)
GLUCOSE SERPL-MCNC: 107 MG/DL (ref 65–100)
HCT VFR BLD AUTO: 48.3 % (ref 35.8–46.3)
HGB BLD-MCNC: 15.3 G/DL (ref 11.7–15.4)
IMM GRANULOCYTES # BLD AUTO: 0.1 K/UL (ref 0–0.5)
IMM GRANULOCYTES NFR BLD AUTO: 0 % (ref 0–5)
LIPASE SERPL-CCNC: 72 U/L (ref 73–393)
LYMPHOCYTES # BLD: 2.1 K/UL (ref 0.5–4.6)
LYMPHOCYTES NFR BLD: 15 % (ref 13–44)
MCH RBC QN AUTO: 25.6 PG (ref 26.1–32.9)
MCHC RBC AUTO-ENTMCNC: 31.7 G/DL (ref 31.4–35)
MCV RBC AUTO: 80.8 FL (ref 79.6–97.8)
MONOCYTES # BLD: 1.3 K/UL (ref 0.1–1.3)
MONOCYTES NFR BLD: 9 % (ref 4–12)
NEUTS SEG # BLD: 10.9 K/UL (ref 1.7–8.2)
NEUTS SEG NFR BLD: 76 % (ref 43–78)
NRBC # BLD: 0 K/UL (ref 0–0.2)
PLATELET # BLD AUTO: 473 K/UL (ref 150–450)
PMV BLD AUTO: 10.2 FL (ref 9.4–12.3)
POTASSIUM SERPL-SCNC: 4 MMOL/L (ref 3.5–5.1)
PROT SERPL-MCNC: 8.9 G/DL (ref 6.3–8.2)
RBC # BLD AUTO: 5.98 M/UL (ref 4.05–5.2)
SERVICE CMNT-IMP: NORMAL
SODIUM SERPL-SCNC: 141 MMOL/L (ref 136–145)
WBC # BLD AUTO: 14.4 K/UL (ref 4.3–11.1)

## 2021-04-13 PROCEDURE — 74177 CT ABD & PELVIS W/CONTRAST: CPT

## 2021-04-13 PROCEDURE — 83690 ASSAY OF LIPASE: CPT

## 2021-04-13 PROCEDURE — 74011000258 HC RX REV CODE- 258: Performed by: EMERGENCY MEDICINE

## 2021-04-13 PROCEDURE — 96375 TX/PRO/DX INJ NEW DRUG ADDON: CPT

## 2021-04-13 PROCEDURE — 74011250636 HC RX REV CODE- 250/636: Performed by: EMERGENCY MEDICINE

## 2021-04-13 PROCEDURE — 74011000636 HC RX REV CODE- 636: Performed by: EMERGENCY MEDICINE

## 2021-04-13 PROCEDURE — 96376 TX/PRO/DX INJ SAME DRUG ADON: CPT

## 2021-04-13 PROCEDURE — 74011000250 HC RX REV CODE- 250: Performed by: EMERGENCY MEDICINE

## 2021-04-13 PROCEDURE — 80053 COMPREHEN METABOLIC PANEL: CPT

## 2021-04-13 PROCEDURE — 85025 COMPLETE CBC W/AUTO DIFF WBC: CPT

## 2021-04-13 PROCEDURE — 99284 EMERGENCY DEPT VISIT MOD MDM: CPT

## 2021-04-13 PROCEDURE — 96374 THER/PROPH/DIAG INJ IV PUSH: CPT

## 2021-04-13 RX ORDER — HYDROMORPHONE HYDROCHLORIDE 1 MG/ML
1 INJECTION, SOLUTION INTRAMUSCULAR; INTRAVENOUS; SUBCUTANEOUS ONCE
Status: COMPLETED | OUTPATIENT
Start: 2021-04-13 | End: 2021-04-13

## 2021-04-13 RX ORDER — PROMETHAZINE HYDROCHLORIDE 25 MG/1
25 TABLET ORAL
Qty: 15 TAB | Refills: 2 | Status: SHIPPED | OUTPATIENT
Start: 2021-04-13

## 2021-04-13 RX ORDER — SODIUM CHLORIDE 0.9 % (FLUSH) 0.9 %
10 SYRINGE (ML) INJECTION
Status: COMPLETED | OUTPATIENT
Start: 2021-04-13 | End: 2021-04-13

## 2021-04-13 RX ADMIN — HYDROMORPHONE HYDROCHLORIDE 1 MG: 1 INJECTION, SOLUTION INTRAMUSCULAR; INTRAVENOUS; SUBCUTANEOUS at 01:45

## 2021-04-13 RX ADMIN — SODIUM CHLORIDE 100 ML: 900 INJECTION, SOLUTION INTRAVENOUS at 03:54

## 2021-04-13 RX ADMIN — IOPAMIDOL 100 ML: 755 INJECTION, SOLUTION INTRAVENOUS at 03:54

## 2021-04-13 RX ADMIN — SODIUM CHLORIDE 1000 ML: 900 INJECTION, SOLUTION INTRAVENOUS at 02:02

## 2021-04-13 RX ADMIN — Medication 10 ML: at 03:54

## 2021-04-13 RX ADMIN — PROMETHAZINE HYDROCHLORIDE 25 MG: 25 INJECTION INTRAMUSCULAR; INTRAVENOUS at 02:02

## 2021-04-13 RX ADMIN — PROMETHAZINE HYDROCHLORIDE 12.5 MG: 25 INJECTION INTRAMUSCULAR; INTRAVENOUS at 04:22

## 2021-04-13 NOTE — DISCHARGE INSTRUCTIONS
Follow up with one of the doctors listed. Return to the ER if your symptoms worsen.     [address] [facility] [place] [address]  [phone]    [facility]  [place] [phone] [phone]

## 2021-04-13 NOTE — ED PROVIDER NOTES
Patient has a history of GERD, migraines and pancreatitis secondary to gallstones status post cholecystectomy complaining of 3 days of nausea and vomiting with abdominal pain. She describes it as diffuse upper achy severe pain without any aggravating or alleviating factors. The pain is currently severe in intensity. She denies any diarrhea or fever, denies any known sick contacts. She has been unable to keep anything down for the past 3 days. She states she does not smoke marijuana. She has had similar pain in the past, was diagnosed with pancreatitis secondary to gallstones. She subsequently had a cholecystectomy.            Past Medical History:   Diagnosis Date    Acute pancreatitis 09/21/2020    hospitalized / cholelithiasis    Cellulitis     GERD (gastroesophageal reflux disease)     only during pregnancy and when taking certain antibotics    Hypertension affecting pregnancy 2019    Migraine headache     Obesity 10/01/2020    BMI 46.94    Otitis media     UTI (urinary tract infection)        Past Surgical History:   Procedure Laterality Date    ERCP  9/20/2020         HX DILATION AND EVACUATION      HX TONSIL AND ADENOIDECTOMY  1990s         Family History:   Problem Relation Age of Onset    Hypertension Mother     No Known Problems Father     No Known Problems Sister     No Known Problems Brother        Social History     Socioeconomic History    Marital status: SINGLE     Spouse name: Not on file    Number of children: Not on file    Years of education: Not on file    Highest education level: Not on file   Occupational History    Not on file   Social Needs    Financial resource strain: Not on file    Food insecurity     Worry: Not on file     Inability: Not on file    Transportation needs     Medical: Not on file     Non-medical: Not on file   Tobacco Use    Smoking status: Never Smoker    Smokeless tobacco: Never Used   Substance and Sexual Activity    Alcohol use: No    Drug use: No    Sexual activity: Not on file   Lifestyle    Physical activity     Days per week: Not on file     Minutes per session: Not on file    Stress: Not on file   Relationships    Social connections     Talks on phone: Not on file     Gets together: Not on file     Attends Jainism service: Not on file     Active member of club or organization: Not on file     Attends meetings of clubs or organizations: Not on file     Relationship status: Not on file    Intimate partner violence     Fear of current or ex partner: Not on file     Emotionally abused: Not on file     Physically abused: Not on file     Forced sexual activity: Not on file   Other Topics Concern    Not on file   Social History Narrative    Not on file         ALLERGIES: Patient has no known allergies. Review of Systems   Constitutional: Negative for chills and fever. Gastrointestinal: Positive for abdominal pain, nausea and vomiting. Negative for diarrhea. All other systems reviewed and are negative. Vitals:    04/13/21 0117   Pulse: 62   Resp: 30   Temp: 98.2 °F (36.8 °C)   SpO2: 100%   Weight: 117.9 kg (260 lb)   Height: 5' 3\" (1.6 m)            Physical Exam  Vitals signs and nursing note reviewed. Constitutional:       Appearance: She is well-developed. She is obese. Comments: Patient appears uncomfortable, actively vomiting   HENT:      Head: Normocephalic and atraumatic. Eyes:      Conjunctiva/sclera: Conjunctivae normal.      Pupils: Pupils are equal, round, and reactive to light. Neck:      Musculoskeletal: Normal range of motion and neck supple. Cardiovascular:      Rate and Rhythm: Normal rate and regular rhythm. Heart sounds: Normal heart sounds. Pulmonary:      Effort: Pulmonary effort is normal. No respiratory distress. Breath sounds: Normal breath sounds. Abdominal:      Palpations: Abdomen is soft. Tenderness: There is abdominal tenderness.           Comments: Diffuse upper abdominal tenderness to palpation as indicated. Hypoactive bowel sounds   Musculoskeletal:         General: No tenderness. Skin:     General: Skin is warm and dry. Neurological:      Mental Status: She is alert and oriented to person, place, and time. Psychiatric:         Behavior: Behavior normal.          MDM  Number of Diagnoses or Management Options  Abdominal pain, epigastric: new and does not require workup  Intractable vomiting with nausea, unspecified vomiting type: new and does not require workup  Leukocytosis, unspecified type  Diagnosis management comments: 4:21 AM discussed results with patient, unremarkable CT findings. Repeat abdominal exam is benign. She states she is feeling better but is starting to feel slightly nauseated. We will repeat her Phenergan. She has Zofran at home, has no primary doctor thus I will provide her with follow-up information.        Amount and/or Complexity of Data Reviewed  Clinical lab tests: ordered and reviewed  Tests in the radiology section of CPT®: ordered and reviewed    Risk of Complications, Morbidity, and/or Mortality  Presenting problems: moderate  Diagnostic procedures: moderate  Management options: moderate    Patient Progress  Patient progress: improved         Procedures

## 2021-04-13 NOTE — ED NOTES
I have reviewed discharge instructions with the patient. The patient verbalized understanding. Patient left ED via Discharge Method: ambulatory to Home with friend. Opportunity for questions and clarification provided. Patient given 1 scripts. To continue your aftercare when you leave the hospital, you may receive an automated call from our care team to check in on how you are doing. This is a free service and part of our promise to provide the best care and service to meet your aftercare needs.  If you have questions, or wish to unsubscribe from this service please call [phone]. Thank you for Choosing our [facility].

## 2021-04-13 NOTE — ED TRIAGE NOTES
Arrives with face mask in place. Arrives from home via [facility] with reports abdominal pain, n/v. Seen here 4/11 for similar symptoms, diagnosed with UTI and prescribed bactrim and zofran. Just filled prescription yesterday. Denies diarrhea, fever/chills. Attempted zofran without relief.  Dry heaving and hyperventilating on arrival
